# Patient Record
Sex: FEMALE | Race: OTHER | Employment: OTHER | URBAN - METROPOLITAN AREA
[De-identification: names, ages, dates, MRNs, and addresses within clinical notes are randomized per-mention and may not be internally consistent; named-entity substitution may affect disease eponyms.]

---

## 2017-01-18 ENCOUNTER — OFFICE VISIT (OUTPATIENT)
Dept: FAMILY MEDICINE CLINIC | Facility: CLINIC | Age: 37
End: 2017-01-18

## 2017-01-18 VITALS
HEART RATE: 96 BPM | SYSTOLIC BLOOD PRESSURE: 124 MMHG | TEMPERATURE: 99 F | DIASTOLIC BLOOD PRESSURE: 90 MMHG | BODY MASS INDEX: 35.18 KG/M2 | RESPIRATION RATE: 17 BRPM | WEIGHT: 211.19 LBS | HEIGHT: 64.96 IN

## 2017-01-18 DIAGNOSIS — E28.2 POLYCYSTIC OVARIES: ICD-10-CM

## 2017-01-18 DIAGNOSIS — N76.0 ACUTE VAGINITIS: ICD-10-CM

## 2017-01-18 DIAGNOSIS — F98.8 ADD (ATTENTION DEFICIT DISORDER): ICD-10-CM

## 2017-01-18 DIAGNOSIS — Z72.0 TOBACCO ABUSE: ICD-10-CM

## 2017-01-18 DIAGNOSIS — E66.09 NON MORBID OBESITY DUE TO EXCESS CALORIES: ICD-10-CM

## 2017-01-18 DIAGNOSIS — I10 ESSENTIAL HYPERTENSION WITH GOAL BLOOD PRESSURE LESS THAN 140/90: Primary | ICD-10-CM

## 2017-01-18 PROBLEM — F06.30 MENSTRUAL-RELATED MOOD DISORDER: Status: ACTIVE | Noted: 2017-01-18

## 2017-01-18 PROCEDURE — 99213 OFFICE O/P EST LOW 20 MIN: CPT | Performed by: FAMILY MEDICINE

## 2017-01-18 PROCEDURE — 99212 OFFICE O/P EST SF 10 MIN: CPT | Performed by: FAMILY MEDICINE

## 2017-01-18 RX ORDER — FLUCONAZOLE 150 MG/1
150 TABLET ORAL ONCE
Qty: 1 TABLET | Refills: 2 | Status: SHIPPED | OUTPATIENT
Start: 2017-01-18 | End: 2017-01-18

## 2017-01-18 RX ORDER — SPIRONOLACTONE 100 MG/1
100 TABLET, FILM COATED ORAL DAILY
Qty: 90 TABLET | Refills: 1 | Status: SHIPPED | OUTPATIENT
Start: 2017-01-18 | End: 2017-07-20

## 2017-01-18 RX ORDER — DEXTROAMPHETAMINE SACCHARATE, AMPHETAMINE ASPARTATE, DEXTROAMPHETAMINE SULFATE AND AMPHETAMINE SULFATE 3.75; 3.75; 3.75; 3.75 MG/1; MG/1; MG/1; MG/1
15 TABLET ORAL DAILY
Qty: 30 TABLET | Refills: 0 | Status: SHIPPED | OUTPATIENT
Start: 2017-01-18 | End: 2017-03-16

## 2017-01-18 RX ORDER — BUPROPION HYDROCHLORIDE 150 MG/1
150 TABLET ORAL DAILY
Qty: 30 TABLET | Refills: 2 | Status: SHIPPED | OUTPATIENT
Start: 2017-01-18 | End: 2017-04-13

## 2017-01-18 NOTE — PROGRESS NOTES
HPI:    Patient ID: Janice Gary is a 39year old female. HTN  This is a new problem. The current episode started more than 1 month ago. The problem has been gradually improving since onset. The problem is uncontrolled.  Associated symptoms include anx She is alert and oriented to person, place, and time. Skin: Skin is warm and dry. Psychiatric: She has a normal mood and affect.               ASSESSMENT/PLAN:   Essential hypertension with goal blood pressure less than 140/90  (primary encounter diagno total) by mouth daily. fluconazole (DIFLUCAN) 150 MG Oral Tab 1 tablet 2      Sig: Take 1 tablet (150 mg total) by mouth once.            Imaging & Referrals:  None       #[id number]

## 2017-03-16 ENCOUNTER — OFFICE VISIT (OUTPATIENT)
Dept: FAMILY MEDICINE CLINIC | Facility: CLINIC | Age: 37
End: 2017-03-16

## 2017-03-16 ENCOUNTER — TELEPHONE (OUTPATIENT)
Dept: FAMILY MEDICINE CLINIC | Facility: CLINIC | Age: 37
End: 2017-03-16

## 2017-03-16 VITALS
HEIGHT: 64.96 IN | HEART RATE: 108 BPM | TEMPERATURE: 98 F | RESPIRATION RATE: 17 BRPM | BODY MASS INDEX: 35.18 KG/M2 | WEIGHT: 211.19 LBS | SYSTOLIC BLOOD PRESSURE: 126 MMHG | DIASTOLIC BLOOD PRESSURE: 88 MMHG

## 2017-03-16 DIAGNOSIS — N30.00 ACUTE CYSTITIS WITHOUT HEMATURIA: ICD-10-CM

## 2017-03-16 DIAGNOSIS — N76.0 ACUTE VAGINITIS: Primary | ICD-10-CM

## 2017-03-16 DIAGNOSIS — R30.0 DYSURIA: Primary | ICD-10-CM

## 2017-03-16 DIAGNOSIS — F98.8 ADD (ATTENTION DEFICIT DISORDER): ICD-10-CM

## 2017-03-16 DIAGNOSIS — I10 ESSENTIAL HYPERTENSION WITH GOAL BLOOD PRESSURE LESS THAN 140/90: ICD-10-CM

## 2017-03-16 LAB
APPEARANCE: CLEAR
BILIRUBIN: NEGATIVE
GLUCOSE (URINE DIPSTICK): NEGATIVE MG/DL
LEUKOCYTES: NEGATIVE
MULTISTIX LOT#: NORMAL NUMERIC
NITRITE, URINE: NEGATIVE
OCCULT BLOOD: NEGATIVE
PH, URINE: 5.5 (ref 4.5–8)
PROTEIN (URINE DIPSTICK): NEGATIVE MG/DL
SPECIFIC GRAVITY: 1.03 (ref 1–1.03)
URINE-COLOR: YELLOW
UROBILINOGEN,SEMI-QN: 0.2 MG/DL (ref 0–1.9)

## 2017-03-16 PROCEDURE — 81003 URINALYSIS AUTO W/O SCOPE: CPT | Performed by: FAMILY MEDICINE

## 2017-03-16 PROCEDURE — 99213 OFFICE O/P EST LOW 20 MIN: CPT | Performed by: FAMILY MEDICINE

## 2017-03-16 RX ORDER — DEXTROAMPHETAMINE SACCHARATE, AMPHETAMINE ASPARTATE, DEXTROAMPHETAMINE SULFATE AND AMPHETAMINE SULFATE 3.75; 3.75; 3.75; 3.75 MG/1; MG/1; MG/1; MG/1
15 TABLET ORAL DAILY
Qty: 30 TABLET | Refills: 0 | Status: SHIPPED | OUTPATIENT
Start: 2017-03-16 | End: 2018-04-20

## 2017-03-16 RX ORDER — AMLODIPINE BESYLATE 5 MG/1
5 TABLET ORAL DAILY
Qty: 90 TABLET | Refills: 3 | Status: SHIPPED | OUTPATIENT
Start: 2017-03-16 | End: 2018-03-21

## 2017-03-16 NOTE — PROGRESS NOTES
HPI:    Patient ID: James Jones is a 39year old female. Painful Urination   This is a new problem. The current episode started in the past 7 days. The problem has been rapidly improving. The quality of the pain is described as burning.  There has bee hematuria  Add (attention deficit disorder)  Essential hypertension with goal blood pressure less than 140/90     UTI–patient had severe dysuria and frequency 2 days ago, seen in ER, treated with ? Levaquin.   Symptoms resolved, urinalysis normal but very c

## 2017-03-23 ENCOUNTER — LAB ENCOUNTER (OUTPATIENT)
Dept: LAB | Age: 37
End: 2017-03-23
Attending: FAMILY MEDICINE
Payer: COMMERCIAL

## 2017-03-23 ENCOUNTER — OFFICE VISIT (OUTPATIENT)
Dept: FAMILY MEDICINE CLINIC | Facility: CLINIC | Age: 37
End: 2017-03-23

## 2017-03-23 VITALS
RESPIRATION RATE: 16 BRPM | TEMPERATURE: 98 F | WEIGHT: 205 LBS | HEIGHT: 64 IN | SYSTOLIC BLOOD PRESSURE: 131 MMHG | DIASTOLIC BLOOD PRESSURE: 91 MMHG | BODY MASS INDEX: 35 KG/M2 | HEART RATE: 111 BPM

## 2017-03-23 DIAGNOSIS — I10 ESSENTIAL HYPERTENSION WITH GOAL BLOOD PRESSURE LESS THAN 140/90: ICD-10-CM

## 2017-03-23 DIAGNOSIS — Z01.419 ENCOUNTER FOR GYNECOLOGICAL EXAMINATION WITHOUT ABNORMAL FINDING: Primary | ICD-10-CM

## 2017-03-23 DIAGNOSIS — Z01.419 PAP SMEAR, LOW-RISK: Primary | ICD-10-CM

## 2017-03-23 DIAGNOSIS — Z72.0 TOBACCO ABUSE: ICD-10-CM

## 2017-03-23 DIAGNOSIS — N76.0 ACUTE VAGINITIS: ICD-10-CM

## 2017-03-23 DIAGNOSIS — F98.8 ADD (ATTENTION DEFICIT DISORDER): ICD-10-CM

## 2017-03-23 DIAGNOSIS — E66.9 NON MORBID OBESITY, UNSPECIFIED OBESITY TYPE: ICD-10-CM

## 2017-03-23 DIAGNOSIS — Z01.419 ENCOUNTER FOR GYNECOLOGICAL EXAMINATION WITHOUT ABNORMAL FINDING: ICD-10-CM

## 2017-03-23 LAB
ANION GAP SERPL CALC-SCNC: 7 MMOL/L (ref 0–18)
BUN SERPL-MCNC: 10 MG/DL (ref 8–20)
BUN/CREAT SERPL: 11.8 (ref 10–20)
CALCIUM SERPL-MCNC: 9.8 MG/DL (ref 8.5–10.5)
CHLORIDE SERPL-SCNC: 105 MMOL/L (ref 95–110)
CHOLEST SERPL-MCNC: 183 MG/DL (ref 110–200)
CO2 SERPL-SCNC: 26 MMOL/L (ref 22–32)
CREAT SERPL-MCNC: 0.85 MG/DL (ref 0.5–1.5)
ERYTHROCYTE [DISTWIDTH] IN BLOOD BY AUTOMATED COUNT: 13.5 % (ref 11–15)
GLUCOSE SERPL-MCNC: 95 MG/DL (ref 70–99)
HCT VFR BLD AUTO: 39.3 % (ref 35–48)
HDLC SERPL-MCNC: 68 MG/DL
HGB BLD-MCNC: 13.1 G/DL (ref 12–16)
LDLC SERPL CALC-MCNC: 103 MG/DL (ref 0–99)
MCH RBC QN AUTO: 29.4 PG (ref 27–32)
MCHC RBC AUTO-ENTMCNC: 33.4 G/DL (ref 32–37)
MCV RBC AUTO: 88.1 FL (ref 80–100)
NONHDLC SERPL-MCNC: 115 MG/DL
OSMOLALITY UR CALC.SUM OF ELEC: 285 MOSM/KG (ref 275–295)
PLATELET # BLD AUTO: 276 K/UL (ref 140–400)
PMV BLD AUTO: 10 FL (ref 7.4–10.3)
POTASSIUM SERPL-SCNC: 4.4 MMOL/L (ref 3.3–5.1)
RBC # BLD AUTO: 4.47 M/UL (ref 3.7–5.4)
SODIUM SERPL-SCNC: 138 MMOL/L (ref 136–144)
TRIGL SERPL-MCNC: 61 MG/DL (ref 1–149)
TSH SERPL-ACNC: 0.95 UIU/ML (ref 0.34–5.6)
WBC # BLD AUTO: 7.2 K/UL (ref 4–11)

## 2017-03-23 PROCEDURE — 86696 HERPES SIMPLEX TYPE 2 TEST: CPT | Performed by: FAMILY MEDICINE

## 2017-03-23 PROCEDURE — 86694 HERPES SIMPLEX NES ANTBDY: CPT | Performed by: FAMILY MEDICINE

## 2017-03-23 PROCEDURE — 84443 ASSAY THYROID STIM HORMONE: CPT

## 2017-03-23 PROCEDURE — 86695 HERPES SIMPLEX TYPE 1 TEST: CPT | Performed by: FAMILY MEDICINE

## 2017-03-23 PROCEDURE — 86780 TREPONEMA PALLIDUM: CPT

## 2017-03-23 PROCEDURE — 85027 COMPLETE CBC AUTOMATED: CPT

## 2017-03-23 PROCEDURE — 36415 COLL VENOUS BLD VENIPUNCTURE: CPT

## 2017-03-23 PROCEDURE — 87389 HIV-1 AG W/HIV-1&-2 AB AG IA: CPT

## 2017-03-23 PROCEDURE — 99395 PREV VISIT EST AGE 18-39: CPT | Performed by: FAMILY MEDICINE

## 2017-03-23 PROCEDURE — 80048 BASIC METABOLIC PNL TOTAL CA: CPT

## 2017-03-23 PROCEDURE — 80061 LIPID PANEL: CPT

## 2017-03-23 NOTE — PROGRESS NOTES
HPI:    Patient ID: Tyler Verduzco is a 39year old female. HPI    Review of Systems   Constitutional: Negative. Respiratory: Negative. Cardiovascular: Negative. Gastrointestinal: Negative.     Genitourinary: Positive for vaginal discharge and gen regular. Using condoms. Again discussed contraceptive options, encourage IUD. Nonfasting labs done today. Pap smear done.     Hypertension–blood pressure controlled with spironolactone and amlodipine, continue current medications    Nonmorbid obesity–di

## 2017-03-24 ENCOUNTER — TELEPHONE (OUTPATIENT)
Dept: FAMILY MEDICINE CLINIC | Facility: CLINIC | Age: 37
End: 2017-03-24

## 2017-03-24 LAB
HIV1+2 AB SERPL QL IA: NONREACTIVE
HSV1 DNA SPEC QL NAA+PROBE: NEGATIVE
HSV2 DNA SPEC QL NAA+PROBE: POSITIVE
T PALLIDUM AB SER QL: NEGATIVE

## 2017-03-24 RX ORDER — VALACYCLOVIR HYDROCHLORIDE 1 G/1
1 TABLET, FILM COATED ORAL EVERY 12 HOURS SCHEDULED
Qty: 20 TABLET | Refills: 0 | Status: SHIPPED | OUTPATIENT
Start: 2017-03-24 | End: 2017-04-03

## 2017-03-25 LAB
C TRACH DNA SPEC QL NAA+PROBE: NEGATIVE
HSV 1 GLYCOPROTEIN G AB, IGG: 4.79 IV
HSV 2 GLYCOPROTEIN G AB, IGG: >23.6 IV
N GONORRHOEA DNA SPEC QL NAA+PROBE: NEGATIVE

## 2017-03-26 LAB
HSV TYPE 1/2 COMBINED AB, IGG: >22.4 IV
HSV TYPE 1/2 COMBINED ABS, IGM: 1.27 IV

## 2017-03-29 LAB — HPV I/H RISK 1 DNA SPEC QL NAA+PROBE: NEGATIVE

## 2017-04-10 ENCOUNTER — TELEPHONE (OUTPATIENT)
Dept: FAMILY MEDICINE CLINIC | Facility: CLINIC | Age: 37
End: 2017-04-10

## 2017-04-10 RX ORDER — DEXTROAMPHETAMINE SACCHARATE, AMPHETAMINE ASPARTATE, DEXTROAMPHETAMINE SULFATE AND AMPHETAMINE SULFATE 3.75; 3.75; 3.75; 3.75 MG/1; MG/1; MG/1; MG/1
15 TABLET ORAL DAILY
Qty: 30 TABLET | Refills: 0 | Status: CANCELLED | OUTPATIENT
Start: 2017-04-10

## 2017-04-10 NOTE — TELEPHONE ENCOUNTER
Pt calling regarding  refill request for amphetamine-dextroamphetamine (ADDERALL) 15 MG Oral Tab      Current Outpatient Prescriptions:  amphetamine-dextroamphetamine (ADDERALL) 15 MG Oral Tab Take 1 tablet (15 mg total) by mouth daily.  Disp: 30 tablet Rfl

## 2017-04-11 RX ORDER — DEXTROAMPHETAMINE SACCHARATE, AMPHETAMINE ASPARTATE, DEXTROAMPHETAMINE SULFATE AND AMPHETAMINE SULFATE 3.75; 3.75; 3.75; 3.75 MG/1; MG/1; MG/1; MG/1
15 TABLET ORAL DAILY
Qty: 30 TABLET | Refills: 0 | Status: SHIPPED | OUTPATIENT
Start: 2017-04-11 | End: 2017-05-11

## 2017-04-11 RX ORDER — DEXTROAMPHETAMINE SACCHARATE, AMPHETAMINE ASPARTATE, DEXTROAMPHETAMINE SULFATE AND AMPHETAMINE SULFATE 3.75; 3.75; 3.75; 3.75 MG/1; MG/1; MG/1; MG/1
15 TABLET ORAL DAILY
Qty: 30 TABLET | Refills: 0 | Status: SHIPPED | OUTPATIENT
Start: 2017-06-10 | End: 2017-07-10

## 2017-04-11 RX ORDER — DEXTROAMPHETAMINE SACCHARATE, AMPHETAMINE ASPARTATE, DEXTROAMPHETAMINE SULFATE AND AMPHETAMINE SULFATE 3.75; 3.75; 3.75; 3.75 MG/1; MG/1; MG/1; MG/1
15 TABLET ORAL DAILY
Qty: 30 TABLET | Refills: 0 | Status: SHIPPED | OUTPATIENT
Start: 2017-05-11 | End: 2017-06-10

## 2017-04-11 NOTE — TELEPHONE ENCOUNTER
Pt called and was informed that her med was approved.  She will p/u scripts tomorrow 04/12 while at her son's appt

## 2017-04-11 NOTE — TELEPHONE ENCOUNTER
Requesting Amphetamine-dextroamphetamine refill    Last filled 3/16/17  Last OV 3/23/17    Med pended for review

## 2017-04-12 RX ORDER — DEXTROAMPHETAMINE SACCHARATE, AMPHETAMINE ASPARTATE, DEXTROAMPHETAMINE SULFATE AND AMPHETAMINE SULFATE 3.75; 3.75; 3.75; 3.75 MG/1; MG/1; MG/1; MG/1
15 TABLET ORAL DAILY
Qty: 30 TABLET | Refills: 0 | Status: CANCELLED | OUTPATIENT
Start: 2017-04-12

## 2017-04-12 RX ORDER — VALACYCLOVIR HYDROCHLORIDE 500 MG/1
500 TABLET, FILM COATED ORAL DAILY
Qty: 90 TABLET | Refills: 3 | Status: SHIPPED | OUTPATIENT
Start: 2017-04-12 | End: 2018-12-14

## 2017-04-12 NOTE — TELEPHONE ENCOUNTER
From: Sg Davidson  To: Ana Farah MD  Sent: 4/10/2017 2:30 PM CDT  Subject: Medication Renewal Request    Original authorizing provider: MD Gita Garrido would like a refill of the following medications:  amphetamine-dextroamphetami

## 2017-04-13 RX ORDER — BUPROPION HYDROCHLORIDE 150 MG/1
TABLET ORAL
Qty: 30 TABLET | Refills: 11 | Status: SHIPPED | OUTPATIENT
Start: 2017-04-13 | End: 2018-05-22

## 2017-07-23 RX ORDER — SPIRONOLACTONE 100 MG/1
TABLET, FILM COATED ORAL
Qty: 90 TABLET | Refills: 1 | Status: SHIPPED | OUTPATIENT
Start: 2017-07-23 | End: 2017-12-15

## 2017-09-12 NOTE — PROGRESS NOTES
HPI:    Patient ID: Wesley Lerma is a 39year old female. ADD   This is a chronic problem. The current episode started more than 1 year ago. The problem occurs daily. The problem has been unchanged.  Pertinent negatives include no fatigue, headaches or n mood and affect. ASSESSMENT/PLAN:   Attention deficit disorder (add) without hyperactivity  (primary encounter diagnosis)  Tobacco abuse     Attention deficit disorder–patient continues to do well with Adderall 15 mg every morning.   She contin

## 2017-12-15 RX ORDER — SPIRONOLACTONE 100 MG/1
TABLET, FILM COATED ORAL
Qty: 90 TABLET | Refills: 0 | Status: SHIPPED | OUTPATIENT
Start: 2017-12-15 | End: 2018-04-23

## 2017-12-27 NOTE — TELEPHONE ENCOUNTER
Pt called in requesting a 90 day supply of the following medication;      Current Outpatient Prescriptions:       •  amphetamine-dextroamphetamine (ADDERALL) 15 MG Oral Tab, Take 1 tablet (15 mg total) by mouth daily. , Disp: 30 tablet, Rfl: 0

## 2017-12-28 RX ORDER — DEXTROAMPHETAMINE SACCHARATE, AMPHETAMINE ASPARTATE, DEXTROAMPHETAMINE SULFATE AND AMPHETAMINE SULFATE 3.75; 3.75; 3.75; 3.75 MG/1; MG/1; MG/1; MG/1
15 TABLET ORAL DAILY
Qty: 30 TABLET | Refills: 0 | Status: SHIPPED | OUTPATIENT
Start: 2018-02-26 | End: 2018-03-28

## 2017-12-28 RX ORDER — DEXTROAMPHETAMINE SACCHARATE, AMPHETAMINE ASPARTATE, DEXTROAMPHETAMINE SULFATE AND AMPHETAMINE SULFATE 3.75; 3.75; 3.75; 3.75 MG/1; MG/1; MG/1; MG/1
15 TABLET ORAL DAILY
Qty: 30 TABLET | Refills: 0 | Status: SHIPPED | OUTPATIENT
Start: 2018-01-27 | End: 2018-02-26

## 2017-12-28 RX ORDER — DEXTROAMPHETAMINE SACCHARATE, AMPHETAMINE ASPARTATE, DEXTROAMPHETAMINE SULFATE AND AMPHETAMINE SULFATE 3.75; 3.75; 3.75; 3.75 MG/1; MG/1; MG/1; MG/1
15 TABLET ORAL DAILY
Qty: 30 TABLET | Refills: 0 | Status: SHIPPED | OUTPATIENT
Start: 2017-12-28 | End: 2018-01-27

## 2018-02-16 RX ORDER — SPIRONOLACTONE 100 MG/1
TABLET, FILM COATED ORAL
Qty: 90 TABLET | Refills: 0 | Status: SHIPPED | OUTPATIENT
Start: 2018-02-16 | End: 2018-04-23

## 2018-03-22 RX ORDER — AMLODIPINE BESYLATE 5 MG/1
TABLET ORAL
Qty: 90 TABLET | Refills: 0 | Status: SHIPPED | OUTPATIENT
Start: 2018-03-22 | End: 2018-04-23

## 2018-03-22 NOTE — TELEPHONE ENCOUNTER
[facility] FYI. Spoke with pt to schedule appt. Pt stated that she does not have insurance right now and will call back to schedule once she get insurance.

## 2018-04-19 NOTE — TELEPHONE ENCOUNTER
Pt is out of medication. Pt has appt on 5/22      amphetamine-dextroamphetamine (ADDERALL) 15 MG Oral Tab Take 1 tablet (15 mg total) by mouth daily.  Disp: 30 tablet Rfl: 0

## 2018-04-20 RX ORDER — DEXTROAMPHETAMINE SACCHARATE, AMPHETAMINE ASPARTATE, DEXTROAMPHETAMINE SULFATE AND AMPHETAMINE SULFATE 3.75; 3.75; 3.75; 3.75 MG/1; MG/1; MG/1; MG/1
15 TABLET ORAL DAILY
Qty: 30 TABLET | Refills: 0 | Status: SHIPPED | OUTPATIENT
Start: 2018-04-20 | End: 2018-05-22 | Stop reason: ALTCHOICE

## 2018-04-20 NOTE — TELEPHONE ENCOUNTER
Please advise on refill request.     Refill Protocol Appointment Criteria  · Appointment scheduled in the past 6 months or in the next 3 months  Recent Outpatient Visits            7 months ago Attention deficit disorder (ADD) without hyperactivity    [facility]

## 2018-04-23 ENCOUNTER — OFFICE VISIT (OUTPATIENT)
Dept: FAMILY MEDICINE CLINIC | Facility: CLINIC | Age: 38
End: 2018-04-23

## 2018-04-23 VITALS
DIASTOLIC BLOOD PRESSURE: 88 MMHG | HEART RATE: 111 BPM | SYSTOLIC BLOOD PRESSURE: 143 MMHG | RESPIRATION RATE: 14 BRPM | WEIGHT: 225.63 LBS | HEIGHT: 65 IN | BODY MASS INDEX: 37.59 KG/M2 | TEMPERATURE: 98 F

## 2018-04-23 DIAGNOSIS — I10 ESSENTIAL HYPERTENSION: Primary | ICD-10-CM

## 2018-04-23 DIAGNOSIS — E11.9 TYPE 2 DIABETES MELLITUS WITHOUT COMPLICATION, WITHOUT LONG-TERM CURRENT USE OF INSULIN (HCC): ICD-10-CM

## 2018-04-23 PROCEDURE — 99212 OFFICE O/P EST SF 10 MIN: CPT | Performed by: FAMILY MEDICINE

## 2018-04-23 PROCEDURE — 99214 OFFICE O/P EST MOD 30 MIN: CPT | Performed by: FAMILY MEDICINE

## 2018-04-23 RX ORDER — SPIRONOLACTONE 100 MG/1
TABLET, FILM COATED ORAL
Qty: 90 TABLET | Refills: 1 | Status: SHIPPED | OUTPATIENT
Start: 2018-04-23 | End: 2018-12-13

## 2018-04-23 RX ORDER — AMLODIPINE BESYLATE 5 MG/1
TABLET ORAL
Qty: 90 TABLET | Refills: 0 | Status: SHIPPED | OUTPATIENT
Start: 2018-04-23 | End: 2018-05-22 | Stop reason: ALTCHOICE

## 2018-04-23 NOTE — PROGRESS NOTES
HPI:    Armando Escalante is a 44year old female presents to clinic for follow up. Has HTN. Needs refills on meds. Notes compliance with meds. Still eats fast foods. Notes that she quit smoking 2 months back, has gained weight because she snacks more.  Vida Patient Position: Sitting   Cuff Size: adult   Pulse: 111   Resp: 14   Temp: 98.2 °F (36.8 °C)   TempSrc: Tympanic   Weight: 225 lb 9.6 oz (102.3 kg)   Height: 5' 5\" (1.651 m)   Physical Exam   Constitutional: She is well-developed, well-nourished, and AmLODIPine Besylate 5 MG Oral Tab 90 tablet 0      Sig: TAKE 1 TABLET(5 MG) BY MOUTH DAILY           Imaging & Referrals:  None       4/23/2018  Lauren Orr MD

## 2018-05-22 ENCOUNTER — OFFICE VISIT (OUTPATIENT)
Dept: FAMILY MEDICINE CLINIC | Facility: CLINIC | Age: 38
End: 2018-05-22

## 2018-05-22 VITALS
TEMPERATURE: 98 F | BODY MASS INDEX: 36.55 KG/M2 | RESPIRATION RATE: 17 BRPM | HEART RATE: 93 BPM | WEIGHT: 219.38 LBS | DIASTOLIC BLOOD PRESSURE: 79 MMHG | SYSTOLIC BLOOD PRESSURE: 116 MMHG | HEIGHT: 65 IN

## 2018-05-22 DIAGNOSIS — D62 ANEMIA DUE TO ACUTE BLOOD LOSS: ICD-10-CM

## 2018-05-22 DIAGNOSIS — I10 ESSENTIAL HYPERTENSION: ICD-10-CM

## 2018-05-22 DIAGNOSIS — K52.9 GASTROENTERITIS: ICD-10-CM

## 2018-05-22 DIAGNOSIS — F98.8 ATTENTION DEFICIT DISORDER (ADD) WITHOUT HYPERACTIVITY: ICD-10-CM

## 2018-05-22 DIAGNOSIS — R73.9 ELEVATED BLOOD SUGAR: ICD-10-CM

## 2018-05-22 DIAGNOSIS — Z87.891 FORMER SMOKER: ICD-10-CM

## 2018-05-22 DIAGNOSIS — IMO0001 POSTOPERATIVE WOUND INFECTION, SUBSEQUENT ENCOUNTER: Primary | ICD-10-CM

## 2018-05-22 PROBLEM — T81.49XA POSTOPERATIVE WOUND INFECTION: Status: ACTIVE | Noted: 2018-05-22

## 2018-05-22 PROCEDURE — 99213 OFFICE O/P EST LOW 20 MIN: CPT | Performed by: FAMILY MEDICINE

## 2018-05-22 PROCEDURE — 99212 OFFICE O/P EST SF 10 MIN: CPT | Performed by: FAMILY MEDICINE

## 2018-05-22 RX ORDER — HYDROCODONE BITARTRATE AND ACETAMINOPHEN 5; 325 MG/1; MG/1
1 TABLET ORAL
COMMUNITY
Start: 2018-05-18 | End: 2018-05-29

## 2018-05-22 RX ORDER — BUPROPION HYDROCHLORIDE 150 MG/1
TABLET ORAL
Qty: 30 TABLET | Refills: 11 | Status: SHIPPED | OUTPATIENT
Start: 2018-05-22 | End: 2019-01-07 | Stop reason: ALTCHOICE

## 2018-05-22 RX ORDER — METRONIDAZOLE 500 MG/1
500 TABLET ORAL
COMMUNITY
Start: 2018-05-18 | End: 2018-05-25

## 2018-05-22 RX ORDER — AMOXICILLIN AND CLAVULANATE POTASSIUM 875; 125 MG/1; MG/1
875 TABLET, FILM COATED ORAL
COMMUNITY
Start: 2018-05-18 | End: 2018-05-22

## 2018-05-22 RX ORDER — FERROUS SULFATE 325(65) MG
325 TABLET ORAL
COMMUNITY
Start: 2018-05-18 | End: 2018-10-12 | Stop reason: ALTCHOICE

## 2018-05-22 NOTE — PROGRESS NOTES
HPI:    Patient ID: Luma Buckner is a 44year old female. Wound Recheck   The current episode started 1 to 4 weeks ago. The problem has been gradually improving. Associated symptoms include fatigue.  Pertinent negatives include no chest pain, fever or Postoperative wound infection, subsequent encounter  (primary encounter diagnosis)  Gastroenteritis  Essential hypertension  Elevated blood sugar  Attention deficit disorder (add) without hyperactivity  Former smoker     Postoperative wound infection–pat continue iron and recheck CBC in 3 months. No orders of the defined types were placed in this encounter.       Meds This Visit:  Signed Prescriptions Disp Refills    BuPROPion HCl ER, XL, 150 MG Oral Tablet 24 Hr 30 tablet 11      Sig: TAKE 1 TABLET(15

## 2018-05-29 ENCOUNTER — TELEPHONE (OUTPATIENT)
Dept: OTHER | Age: 38
End: 2018-05-29

## 2018-05-29 ENCOUNTER — OFFICE VISIT (OUTPATIENT)
Dept: FAMILY MEDICINE CLINIC | Facility: CLINIC | Age: 38
End: 2018-05-29

## 2018-05-29 VITALS
WEIGHT: 221 LBS | SYSTOLIC BLOOD PRESSURE: 134 MMHG | BODY MASS INDEX: 37 KG/M2 | DIASTOLIC BLOOD PRESSURE: 89 MMHG | RESPIRATION RATE: 16 BRPM | TEMPERATURE: 98 F | HEART RATE: 104 BPM

## 2018-05-29 DIAGNOSIS — Z51.89 VISIT FOR WOUND CHECK: Primary | ICD-10-CM

## 2018-05-29 PROCEDURE — 99212 OFFICE O/P EST SF 10 MIN: CPT | Performed by: FAMILY MEDICINE

## 2018-05-29 PROCEDURE — 99211 OFF/OP EST MAY X REQ PHY/QHP: CPT | Performed by: FAMILY MEDICINE

## 2018-05-29 RX ORDER — AMLODIPINE BESYLATE 5 MG/1
5 TABLET ORAL DAILY
COMMUNITY
End: 2018-10-12 | Stop reason: ALTCHOICE

## 2018-05-29 NOTE — TELEPHONE ENCOUNTER
Pt called to F/U as mother is changing wound dressing BID and yesterday noticed malodorous green tinged drainage from buttocks area. Pt is requesting abx be prescribed. Afebrile denies tenderness to area. Advised OV for eval and assessment.   Pt schedule

## 2018-05-29 NOTE — TELEPHONE ENCOUNTER
Pt called to f/u and was informed of [facility]'s response below. Pt agrees and will come at 2:30pm today as scheduled.

## 2018-06-08 ENCOUNTER — NURSE TRIAGE (OUTPATIENT)
Dept: OTHER | Age: 38
End: 2018-06-08

## 2018-06-08 NOTE — TELEPHONE ENCOUNTER
Unfortunately, I cannot see her today. Can we give acute visit for Monday. Continue current Zyrtec and Benadryl and I will send Rx for cortisone cream to pharmacy. ER if worsening symptoms over the weekend.

## 2018-06-08 NOTE — TELEPHONE ENCOUNTER
Spoke with patient and relayed [facility] message below--patient verbalizes understanding and agreement. Appt made for Monday, 6/11/18 at 11:45 a.m.--per [facility]. No further questions/concerns at this time.

## 2018-06-08 NOTE — TELEPHONE ENCOUNTER
Action Requested: Summary for Provider     []  Critical Lab, Recommendations Needed  [x] Need Additional Advice  []   FYI    []   Need Orders  [x] Need Medications Sent to Pharmacy  []  Other     SUMMARY: Patient requesting appt with [facility] today and/or Rx to

## 2018-06-19 ENCOUNTER — OFFICE VISIT (OUTPATIENT)
Dept: FAMILY MEDICINE CLINIC | Facility: CLINIC | Age: 38
End: 2018-06-19

## 2018-06-19 ENCOUNTER — LAB ENCOUNTER (OUTPATIENT)
Dept: LAB | Age: 38
End: 2018-06-19
Attending: FAMILY MEDICINE
Payer: COMMERCIAL

## 2018-06-19 VITALS
HEIGHT: 65 IN | HEART RATE: 107 BPM | SYSTOLIC BLOOD PRESSURE: 138 MMHG | DIASTOLIC BLOOD PRESSURE: 91 MMHG | TEMPERATURE: 99 F | RESPIRATION RATE: 17 BRPM

## 2018-06-19 DIAGNOSIS — N91.3 PRIMARY OLIGOMENORRHEA: ICD-10-CM

## 2018-06-19 DIAGNOSIS — IMO0001 POSTOPERATIVE WOUND INFECTION, SUBSEQUENT ENCOUNTER: ICD-10-CM

## 2018-06-19 DIAGNOSIS — L50.9 URTICARIA: Primary | ICD-10-CM

## 2018-06-19 DIAGNOSIS — R60.9 SWELLING: ICD-10-CM

## 2018-06-19 DIAGNOSIS — L50.9 URTICARIA: ICD-10-CM

## 2018-06-19 PROCEDURE — 99213 OFFICE O/P EST LOW 20 MIN: CPT | Performed by: FAMILY MEDICINE

## 2018-06-19 PROCEDURE — 83001 ASSAY OF GONADOTROPIN (FSH): CPT

## 2018-06-19 PROCEDURE — 36415 COLL VENOUS BLD VENIPUNCTURE: CPT

## 2018-06-19 PROCEDURE — 84703 CHORIONIC GONADOTROPIN ASSAY: CPT

## 2018-06-19 PROCEDURE — 84443 ASSAY THYROID STIM HORMONE: CPT

## 2018-06-19 PROCEDURE — 99212 OFFICE O/P EST SF 10 MIN: CPT | Performed by: FAMILY MEDICINE

## 2018-06-19 PROCEDURE — 80048 BASIC METABOLIC PNL TOTAL CA: CPT

## 2018-06-19 PROCEDURE — 85025 COMPLETE CBC W/AUTO DIFF WBC: CPT

## 2018-06-19 RX ORDER — METHYLPREDNISOLONE 4 MG/1
TABLET ORAL
Qty: 1 KIT | Refills: 0 | Status: SHIPPED | OUTPATIENT
Start: 2018-06-19 | End: 2018-10-12 | Stop reason: ALTCHOICE

## 2018-06-19 NOTE — PROGRESS NOTES
HPI:    Patient ID: Venus Borden is a 44year old female. See below        Review of Systems   Constitutional: Negative for fever. Respiratory: Negative for shortness of breath and wheezing. Cardiovascular: Negative for chest pain.    Yasmin Gold urticaria 5 days ago, she has been using Zyrtec and Benadryl with moderate improvement but no resolution. Today on exam macular erythematous lesions on elbows, chest, back. Dermatographia present. Recommend continued Zyrtec daily for 1 month.   Medrol Do

## 2018-06-22 ENCOUNTER — PATIENT MESSAGE (OUTPATIENT)
Dept: FAMILY MEDICINE CLINIC | Facility: CLINIC | Age: 38
End: 2018-06-22

## 2018-06-22 NOTE — TELEPHONE ENCOUNTER
From: Lucy Davidson  To: Jacky Ordoñez MD  Sent: 6/22/2018 4:00 PM CDT  Subject: Visit Zaire Valdivia,    I am almost finished with my steroids for the hives but it didn't seem to work. I have three pills left. Suggestions?  Or shoul

## 2018-06-23 NOTE — TELEPHONE ENCOUNTER
Dr. Jacquie Porter, see pt response: To: EM CONOR Germain      From: Lexi Davidson      Created: 6/23/2018 11:54 AM        *-*-*This message has not been handled. *-*-*    Never got better.  No change.

## 2018-07-09 ENCOUNTER — PATIENT MESSAGE (OUTPATIENT)
Dept: FAMILY MEDICINE CLINIC | Facility: CLINIC | Age: 38
End: 2018-07-09

## 2018-07-10 ENCOUNTER — TELEPHONE (OUTPATIENT)
Dept: OTHER | Age: 38
End: 2018-07-10

## 2018-07-10 DIAGNOSIS — L50.9 URTICARIA: Primary | ICD-10-CM

## 2018-07-10 DIAGNOSIS — L50.3 DERMATOGRAPHIA: ICD-10-CM

## 2018-07-10 RX ORDER — DEXTROAMPHETAMINE SACCHARATE, AMPHETAMINE ASPARTATE, DEXTROAMPHETAMINE SULFATE AND AMPHETAMINE SULFATE 5; 5; 5; 5 MG/1; MG/1; MG/1; MG/1
20 TABLET ORAL DAILY
Qty: 30 TABLET | Refills: 0 | Status: SHIPPED | OUTPATIENT
Start: 2018-08-09 | End: 2018-09-08

## 2018-07-10 RX ORDER — DEXTROAMPHETAMINE SACCHARATE, AMPHETAMINE ASPARTATE, DEXTROAMPHETAMINE SULFATE AND AMPHETAMINE SULFATE 5; 5; 5; 5 MG/1; MG/1; MG/1; MG/1
20 TABLET ORAL DAILY
Qty: 30 TABLET | Refills: 0 | Status: SHIPPED | OUTPATIENT
Start: 2018-07-10 | End: 2018-08-09

## 2018-07-10 RX ORDER — DEXTROAMPHETAMINE SACCHARATE, AMPHETAMINE ASPARTATE, DEXTROAMPHETAMINE SULFATE AND AMPHETAMINE SULFATE 5; 5; 5; 5 MG/1; MG/1; MG/1; MG/1
20 TABLET ORAL DAILY
Qty: 30 TABLET | Refills: 0 | Status: SHIPPED | OUTPATIENT
Start: 2018-09-08 | End: 2018-10-08

## 2018-07-10 NOTE — TELEPHONE ENCOUNTER
Contacted patient and she states the hives in an ongoing issue. Patient states she sent My Chart message to update Dr. Eric Walsh on her condition. Dr. Eric Walsh, please see patient message below.    Patient also requesting refill for Adderall, requesting in

## 2018-07-10 NOTE — TELEPHONE ENCOUNTER
Please let patient know I recommend evaluation with allergist Dr. Jasen Be for hives. In the meantime, please continue with Zyrtec and Pepcid also recommend stopping these medications 5 days prior to Dr. Jasen Be appointment.   Okay to increase dose of Adderall,

## 2018-07-10 NOTE — TELEPHONE ENCOUNTER
Note      From: Bufford Cushing Hennis  To: Leo Robledo MD  Sent: 7/9/2018  8:14 AM CDT  Subject: Prescription Question    Hi Dr. Adalid Dotson.      I still have the hives and itching.  The zyrtec and pepcid help some but I still look like an etch a sketch.  Raised h

## 2018-07-10 NOTE — TELEPHONE ENCOUNTER
From: Sukumar Davidson  To: Delroy Brower MD  Sent: 7/9/2018 8:14 AM CDT  Subject: Prescription Question    Hi Dr. Dennis Haque. I still have the hives and itching. The zyrtec and pepcid help some but I still look like an etch a sketch. Raised hives.      Inder

## 2018-07-15 RX ORDER — AMLODIPINE BESYLATE 5 MG/1
TABLET ORAL
Qty: 90 TABLET | Refills: 0 | Status: SHIPPED | OUTPATIENT
Start: 2018-07-15 | End: 2018-10-12 | Stop reason: ALTCHOICE

## 2018-10-05 RX ORDER — DEXTROAMPHETAMINE SACCHARATE, AMPHETAMINE ASPARTATE, DEXTROAMPHETAMINE SULFATE AND AMPHETAMINE SULFATE 5; 5; 5; 5 MG/1; MG/1; MG/1; MG/1
20 TABLET ORAL DAILY
Qty: 30 TABLET | Refills: 0 | Status: CANCELLED | OUTPATIENT
Start: 2018-10-05 | End: 2018-11-04

## 2018-10-05 NOTE — TELEPHONE ENCOUNTER
Pt called in stating that she is completely out of medication. Pt is also wondering if she could get a 3 month supply for medication to cut down on going to the office every month for the script. Please advise.

## 2018-10-08 RX ORDER — DEXTROAMPHETAMINE SACCHARATE, AMPHETAMINE ASPARTATE, DEXTROAMPHETAMINE SULFATE AND AMPHETAMINE SULFATE 5; 5; 5; 5 MG/1; MG/1; MG/1; MG/1
20 TABLET ORAL DAILY
Qty: 30 TABLET | Refills: 0 | Status: SHIPPED | OUTPATIENT
Start: 2018-11-05 | End: 2018-10-12

## 2018-10-08 RX ORDER — DEXTROAMPHETAMINE SACCHARATE, AMPHETAMINE ASPARTATE, DEXTROAMPHETAMINE SULFATE AND AMPHETAMINE SULFATE 5; 5; 5; 5 MG/1; MG/1; MG/1; MG/1
20 TABLET ORAL DAILY
Qty: 30 TABLET | Refills: 0 | Status: SHIPPED | OUTPATIENT
Start: 2018-10-08 | End: 2018-11-07

## 2018-10-08 RX ORDER — DEXTROAMPHETAMINE SACCHARATE, AMPHETAMINE ASPARTATE, DEXTROAMPHETAMINE SULFATE AND AMPHETAMINE SULFATE 5; 5; 5; 5 MG/1; MG/1; MG/1; MG/1
20 TABLET ORAL DAILY
Qty: 30 TABLET | Refills: 0 | Status: SHIPPED | OUTPATIENT
Start: 2018-12-04 | End: 2018-10-12

## 2018-10-12 ENCOUNTER — OFFICE VISIT (OUTPATIENT)
Dept: FAMILY MEDICINE CLINIC | Facility: CLINIC | Age: 38
End: 2018-10-12
Payer: COMMERCIAL

## 2018-10-12 VITALS
WEIGHT: 219.81 LBS | DIASTOLIC BLOOD PRESSURE: 68 MMHG | TEMPERATURE: 99 F | SYSTOLIC BLOOD PRESSURE: 106 MMHG | HEART RATE: 68 BPM | BODY MASS INDEX: 37.52 KG/M2 | HEIGHT: 64.25 IN | RESPIRATION RATE: 16 BRPM

## 2018-10-12 DIAGNOSIS — E66.9 NON MORBID OBESITY, UNSPECIFIED OBESITY TYPE: Primary | ICD-10-CM

## 2018-10-12 DIAGNOSIS — I10 HTN (HYPERTENSION), BENIGN: ICD-10-CM

## 2018-10-12 DIAGNOSIS — R06.83 SNORING: ICD-10-CM

## 2018-10-12 PROBLEM — Z72.0 TOBACCO ABUSE: Status: RESOLVED | Noted: 2017-03-23 | Resolved: 2018-10-12

## 2018-10-12 PROCEDURE — 99214 OFFICE O/P EST MOD 30 MIN: CPT | Performed by: FAMILY MEDICINE

## 2018-10-12 PROCEDURE — 99212 OFFICE O/P EST SF 10 MIN: CPT | Performed by: FAMILY MEDICINE

## 2018-10-12 PROCEDURE — 90471 IMMUNIZATION ADMIN: CPT | Performed by: FAMILY MEDICINE

## 2018-10-12 PROCEDURE — 90686 IIV4 VACC NO PRSV 0.5 ML IM: CPT | Performed by: FAMILY MEDICINE

## 2018-10-12 NOTE — PROGRESS NOTES
HPI:    Patient ID: Sophia Kirkland is a 43year old female. Obesity   This is a chronic problem. The current episode started more than 1 year ago. The problem occurs daily. The problem has been unchanged.  Pertinent negatives include no abdominal pain, n Agree with pursuing evaluation and monthly weight checks. Will obtain sleep study and chest x-ray. Will follow-up here preop  for EKG, required labs in 4-6 months.     Orders Placed This Encounter      Flulaval 0.5 ml 6 mon and older Quad single dose PF (

## 2018-12-14 RX ORDER — VALACYCLOVIR HYDROCHLORIDE 500 MG/1
TABLET, FILM COATED ORAL
Qty: 90 TABLET | Refills: 0 | OUTPATIENT
Start: 2018-12-14

## 2018-12-14 RX ORDER — SPIRONOLACTONE 100 MG/1
TABLET, FILM COATED ORAL
Qty: 90 TABLET | Refills: 1 | OUTPATIENT
Start: 2018-12-14

## 2018-12-14 RX ORDER — SPIRONOLACTONE 100 MG/1
TABLET, FILM COATED ORAL
Qty: 90 TABLET | Refills: 0 | Status: SHIPPED | OUTPATIENT
Start: 2018-12-14 | End: 2019-03-21

## 2018-12-14 RX ORDER — VALACYCLOVIR HYDROCHLORIDE 500 MG/1
500 TABLET, FILM COATED ORAL DAILY
Qty: 90 TABLET | Refills: 3 | OUTPATIENT
Start: 2018-12-14

## 2018-12-15 NOTE — TELEPHONE ENCOUNTER
No Protocol on this med. Requested Prescriptions     Pending Prescriptions Disp Refills   • ValACYclovir HCl 500 MG Oral Tab 90 tablet 3     Sig: Take 1 tablet (500 mg total) by mouth daily.        Last Office Visit with PCP: 10/12/2018  Last Blood Pres

## 2018-12-15 NOTE — TELEPHONE ENCOUNTER
Requested Prescriptions     Pending Prescriptions Disp Refills   • VALACYCLOVIR  MG Oral Tab [Pharmacy Med Name: VALACYCLOVIR 500MG TABLETS] 90 tablet 0     Sig: TAKE 1 TABLET(500 MG) BY MOUTH DAILY     Duplicate request.

## 2018-12-16 RX ORDER — VALACYCLOVIR HYDROCHLORIDE 500 MG/1
500 TABLET, FILM COATED ORAL DAILY
Qty: 90 TABLET | Refills: 3 | Status: SHIPPED | OUTPATIENT
Start: 2018-12-16 | End: 2020-01-07

## 2019-01-02 ENCOUNTER — TELEPHONE (OUTPATIENT)
Dept: FAMILY MEDICINE CLINIC | Facility: CLINIC | Age: 39
End: 2019-01-02

## 2019-01-02 NOTE — TELEPHONE ENCOUNTER
Pt called back and stated she does not have an actual surgery date yet    Pt stated it will be after 1/21/19    Per the note below pt has scheduled her Px 1/7/19 at 930am, and a regular OV 1/16/19 at 945am

## 2019-01-02 NOTE — TELEPHONE ENCOUNTER
Pt called in requested orders for      Blood Work  EKG  Chest X Ray    Pt said she is having bariatric surgery surgery  Surgery date probably in Feb

## 2019-01-02 NOTE — TELEPHONE ENCOUNTER
pt has an appt on 01/07/2019, would you pls ask pt when will be her Surgery, because pt needs to come in 30 days with-in her surgery date excluding her yearly px!!! LMTCB!!!!

## 2019-01-03 RX ORDER — DEXTROAMPHETAMINE SACCHARATE, AMPHETAMINE ASPARTATE, DEXTROAMPHETAMINE SULFATE AND AMPHETAMINE SULFATE 5; 5; 5; 5 MG/1; MG/1; MG/1; MG/1
20 TABLET ORAL DAILY
Qty: 30 TABLET | Refills: 0 | Status: CANCELLED | OUTPATIENT
Start: 2019-01-03 | End: 2019-02-02

## 2019-01-03 NOTE — TELEPHONE ENCOUNTER
Please let her know I am out of the office. If needed sooner than Monday let one of my colleagues know.

## 2019-01-03 NOTE — TELEPHONE ENCOUNTER
Spoke with Josue Marsh, She will bring the list from her surgeon of the blood work she needs prior to surgery and she will get the blood work at the [address] along with the EKG and a order for chest xray

## 2019-01-05 RX ORDER — DEXTROAMPHETAMINE SACCHARATE, AMPHETAMINE ASPARTATE, DEXTROAMPHETAMINE SULFATE AND AMPHETAMINE SULFATE 5; 5; 5; 5 MG/1; MG/1; MG/1; MG/1
20 TABLET ORAL DAILY
Qty: 30 TABLET | Refills: 0 | Status: SHIPPED | OUTPATIENT
Start: 2019-03-06 | End: 2019-01-07

## 2019-01-05 RX ORDER — DEXTROAMPHETAMINE SACCHARATE, AMPHETAMINE ASPARTATE, DEXTROAMPHETAMINE SULFATE AND AMPHETAMINE SULFATE 5; 5; 5; 5 MG/1; MG/1; MG/1; MG/1
20 TABLET ORAL DAILY
Qty: 30 TABLET | Refills: 0 | Status: SHIPPED | OUTPATIENT
Start: 2019-02-04 | End: 2019-01-07

## 2019-01-05 RX ORDER — DEXTROAMPHETAMINE SACCHARATE, AMPHETAMINE ASPARTATE, DEXTROAMPHETAMINE SULFATE AND AMPHETAMINE SULFATE 5; 5; 5; 5 MG/1; MG/1; MG/1; MG/1
20 TABLET ORAL DAILY
Qty: 30 TABLET | Refills: 0 | Status: SHIPPED | OUTPATIENT
Start: 2019-01-05 | End: 2019-02-04

## 2019-01-07 ENCOUNTER — APPOINTMENT (OUTPATIENT)
Dept: LAB | Age: 39
End: 2019-01-07
Attending: FAMILY MEDICINE
Payer: COMMERCIAL

## 2019-01-07 ENCOUNTER — OFFICE VISIT (OUTPATIENT)
Dept: FAMILY MEDICINE CLINIC | Facility: CLINIC | Age: 39
End: 2019-01-07

## 2019-01-07 VITALS
SYSTOLIC BLOOD PRESSURE: 138 MMHG | BODY MASS INDEX: 39.23 KG/M2 | HEART RATE: 108 BPM | TEMPERATURE: 99 F | DIASTOLIC BLOOD PRESSURE: 80 MMHG | WEIGHT: 229.81 LBS | HEIGHT: 64 IN | RESPIRATION RATE: 20 BRPM

## 2019-01-07 DIAGNOSIS — E28.2 PCOS (POLYCYSTIC OVARIAN SYNDROME): ICD-10-CM

## 2019-01-07 DIAGNOSIS — F98.8 ATTENTION DEFICIT DISORDER (ADD) WITHOUT HYPERACTIVITY: ICD-10-CM

## 2019-01-07 DIAGNOSIS — Z01.818 PREOP GENERAL PHYSICAL EXAM: ICD-10-CM

## 2019-01-07 DIAGNOSIS — Z00.00 ROUTINE PHYSICAL EXAMINATION: ICD-10-CM

## 2019-01-07 DIAGNOSIS — Z00.00 ROUTINE PHYSICAL EXAMINATION: Primary | ICD-10-CM

## 2019-01-07 DIAGNOSIS — I10 ESSENTIAL HYPERTENSION: ICD-10-CM

## 2019-01-07 DIAGNOSIS — E66.9 NON MORBID OBESITY, UNSPECIFIED OBESITY TYPE: ICD-10-CM

## 2019-01-07 LAB
ANION GAP SERPL CALC-SCNC: 13 MMOL/L (ref 0–18)
BUN SERPL-MCNC: 13 MG/DL (ref 8–20)
BUN/CREAT SERPL: 16.9 (ref 10–20)
CALCIUM SERPL-MCNC: 9.7 MG/DL (ref 8.5–10.5)
CHLORIDE SERPL-SCNC: 100 MMOL/L (ref 95–110)
CHOLEST SERPL-MCNC: 195 MG/DL (ref 110–200)
CO2 SERPL-SCNC: 23 MMOL/L (ref 22–32)
CREAT SERPL-MCNC: 0.77 MG/DL (ref 0.5–1.5)
EST. AVERAGE GLUCOSE BLD GHB EST-MCNC: 120 MG/DL (ref 68–126)
GLUCOSE SERPL-MCNC: 73 MG/DL (ref 70–99)
HBA1C MFR BLD HPLC: 5.8 % (ref ?–5.7)
HDLC SERPL-MCNC: 80 MG/DL
LDLC SERPL CALC-MCNC: 97 MG/DL (ref 0–99)
NONHDLC SERPL-MCNC: 115 MG/DL
OSMOLALITY UR CALC.SUM OF ELEC: 281 MOSM/KG (ref 275–295)
POTASSIUM SERPL-SCNC: 3.6 MMOL/L (ref 3.3–5.1)
SODIUM SERPL-SCNC: 136 MMOL/L (ref 136–144)
TRIGL SERPL-MCNC: 91 MG/DL (ref 1–149)
TSH SERPL-ACNC: 1.5 UIU/ML (ref 0.45–5.33)

## 2019-01-07 PROCEDURE — 87389 HIV-1 AG W/HIV-1&-2 AB AG IA: CPT

## 2019-01-07 PROCEDURE — 84443 ASSAY THYROID STIM HORMONE: CPT

## 2019-01-07 PROCEDURE — 93005 ELECTROCARDIOGRAM TRACING: CPT | Performed by: FAMILY MEDICINE

## 2019-01-07 PROCEDURE — 99395 PREV VISIT EST AGE 18-39: CPT | Performed by: FAMILY MEDICINE

## 2019-01-07 PROCEDURE — 36415 COLL VENOUS BLD VENIPUNCTURE: CPT

## 2019-01-07 PROCEDURE — 80048 BASIC METABOLIC PNL TOTAL CA: CPT

## 2019-01-07 PROCEDURE — 83036 HEMOGLOBIN GLYCOSYLATED A1C: CPT

## 2019-01-07 PROCEDURE — 87491 CHLMYD TRACH DNA AMP PROBE: CPT

## 2019-01-07 PROCEDURE — 87591 N.GONORRHOEAE DNA AMP PROB: CPT

## 2019-01-07 PROCEDURE — 93000 ELECTROCARDIOGRAM COMPLETE: CPT | Performed by: FAMILY MEDICINE

## 2019-01-07 PROCEDURE — 80061 LIPID PANEL: CPT

## 2019-01-07 RX ORDER — MEDROXYPROGESTERONE ACETATE 10 MG/1
10 TABLET ORAL DAILY
Qty: 10 TABLET | Refills: 3 | Status: SHIPPED | OUTPATIENT
Start: 2019-01-07 | End: 2019-01-17

## 2019-01-07 NOTE — PROGRESS NOTES
HPI:    Patient ID: Tommy Peterson is a 43year old female. HPI    Review of Systems   Constitutional: Negative. Respiratory: Negative. Cardiovascular: Negative. Gastrointestinal: Negative. Genitourinary: Positive for menstrual problem.    Sk today.    Nonmorbid obesity–patient is seen Dr. Gino Gutierrez and is planning sleeve gastrectomy. Surgery date will be set at next group session on 1/21/19. Agree with plan, see letter.     Essential hypertension–blood pressure controlled with spironolactone 1

## 2019-01-08 LAB
C TRACH DNA SPEC QL NAA+PROBE: NEGATIVE
HIV1+2 AB SERPL QL IA: NONREACTIVE
N GONORRHOEA DNA SPEC QL NAA+PROBE: NEGATIVE

## 2019-01-15 ENCOUNTER — HOSPITAL ENCOUNTER (OUTPATIENT)
Dept: GENERAL RADIOLOGY | Age: 39
Discharge: HOME | End: 2019-01-15
Attending: FAMILY MEDICINE
Payer: COMMERCIAL

## 2019-01-15 DIAGNOSIS — I10 HTN (HYPERTENSION), BENIGN: ICD-10-CM

## 2019-01-15 DIAGNOSIS — R06.83 SNORING: ICD-10-CM

## 2019-01-15 DIAGNOSIS — E66.9 NON MORBID OBESITY, UNSPECIFIED OBESITY TYPE: ICD-10-CM

## 2019-01-15 PROCEDURE — 71046 X-RAY EXAM CHEST 2 VIEWS: CPT | Performed by: FAMILY MEDICINE

## 2019-02-12 ENCOUNTER — OFFICE VISIT (OUTPATIENT)
Dept: GASTROENTEROLOGY | Facility: CLINIC | Age: 39
End: 2019-02-12
Payer: COMMERCIAL

## 2019-02-12 ENCOUNTER — TELEPHONE (OUTPATIENT)
Dept: FAMILY MEDICINE CLINIC | Facility: CLINIC | Age: 39
End: 2019-02-12

## 2019-02-12 ENCOUNTER — TELEPHONE (OUTPATIENT)
Dept: GASTROENTEROLOGY | Facility: CLINIC | Age: 39
End: 2019-02-12

## 2019-02-12 VITALS
HEART RATE: 112 BPM | SYSTOLIC BLOOD PRESSURE: 140 MMHG | WEIGHT: 232 LBS | HEIGHT: 64 IN | DIASTOLIC BLOOD PRESSURE: 100 MMHG | BODY MASS INDEX: 39.61 KG/M2

## 2019-02-12 DIAGNOSIS — Z01.818 PRE-OPERATIVE CLEARANCE: Primary | ICD-10-CM

## 2019-02-12 DIAGNOSIS — E66.9 NON MORBID OBESITY, UNSPECIFIED OBESITY TYPE: Primary | ICD-10-CM

## 2019-02-12 PROCEDURE — 99243 OFF/OP CNSLTJ NEW/EST LOW 30: CPT | Performed by: PHYSICIAN ASSISTANT

## 2019-02-12 PROCEDURE — 99212 OFFICE O/P EST SF 10 MIN: CPT | Performed by: PHYSICIAN ASSISTANT

## 2019-02-12 RX ORDER — DEXTROAMPHETAMINE SACCHARATE, AMPHETAMINE ASPARTATE, DEXTROAMPHETAMINE SULFATE AND AMPHETAMINE SULFATE 5; 5; 5; 5 MG/1; MG/1; MG/1; MG/1
TABLET ORAL
COMMUNITY
End: 2019-09-14

## 2019-02-12 RX ORDER — MEDROXYPROGESTERONE ACETATE 10 MG/1
10 TABLET ORAL DAILY
COMMUNITY
End: 2021-02-17 | Stop reason: ALTCHOICE

## 2019-02-12 NOTE — TELEPHONE ENCOUNTER
Scheduled for:  EGD 53219  Provider Name: Dr. Nina Cantrell  Date:  2/28/19  Location:  [facility]  Sedation:  MAC  Time:  11:00 am, (pt is aware that [facility] will call the day before to confirm arrival time)  Prep: NPO after midnight  Meds/Allergies Reconciled?:  Lyssa alvarez

## 2019-02-12 NOTE — H&P
[address] [facility] [address] Gastroenterology                                                                                                  Clinic History and Physical     Pa Cancer Maternal Grandmother         throat cancer      Social History: Social History    Tobacco Use      Smoking status: Former Smoker        Types: Cigarettes      Smokeless tobacco: Never Used    Alcohol use:  Yes      Alcohol/week: 0.0 oz      Comment: interactive, and patient is having movements of all 4 extremities   Psych: calm, appropriate    Nursing notes and vitals reviewed. Labs/Imaging:     Patient's labs and imaging were reviewed and discussed with patient today.       ASSESSMENT/PLAN:   Jayme Simental or even death. I also specifically mentioned the miss rate of upper endoscopy of 5-10% in the best of all circumstances. All questions were answered to the patient’s satisfaction.  The patient has agreed to sign an informed consent and elected to proceed wi

## 2019-02-12 NOTE — TELEPHONE ENCOUNTER
Patient called and stated she has an appt today with Dr. Sangita Morris at [place] [address]-     [facility]-    Referral not processed. Informed patient will send request to Dr. La Nena Duenas for approval but may need to reschedule Dr. Herring Speak appt. For future speciality provider appts, patient will need to request referrals. Patient verbally understood.     Please sign off on referral.    Thanks,    Angel

## 2019-02-12 NOTE — PATIENT INSTRUCTIONS
1. Schedule upper endoscopy with MAC anesthesia with Dr. Anni Damico or Ruiz Segovia.     ** If MAC @ [facility]/NE:    - HOLD ACE/ARBs the night before and/or the day of the procedure(s): none    - NO alcohol, recreational drugs nor erectile dysfunction mediations

## 2019-02-26 ENCOUNTER — TELEPHONE (OUTPATIENT)
Dept: GASTROENTEROLOGY | Facility: CLINIC | Age: 39
End: 2019-02-26

## 2019-02-26 DIAGNOSIS — Z01.818 PRE-OPERATIVE CLEARANCE: Primary | ICD-10-CM

## 2019-02-26 NOTE — TELEPHONE ENCOUNTER
Pt needed an EGD for pre op clearance. She is to undergo a sleeve gastrectomy.     Appt cancelled in one medical passport and in EPIC

## 2019-03-21 RX ORDER — SPIRONOLACTONE 100 MG/1
TABLET, FILM COATED ORAL
Qty: 90 TABLET | Refills: 0 | Status: SHIPPED | OUTPATIENT
Start: 2019-03-21 | End: 2019-07-03

## 2019-05-13 ENCOUNTER — PATIENT MESSAGE (OUTPATIENT)
Dept: FAMILY MEDICINE CLINIC | Facility: CLINIC | Age: 39
End: 2019-05-13

## 2019-05-13 RX ORDER — DEXTROAMPHETAMINE SACCHARATE, AMPHETAMINE ASPARTATE, DEXTROAMPHETAMINE SULFATE AND AMPHETAMINE SULFATE 5; 5; 5; 5 MG/1; MG/1; MG/1; MG/1
20 TABLET ORAL DAILY
Qty: 30 TABLET | Refills: 0 | Status: SHIPPED | OUTPATIENT
Start: 2019-05-13 | End: 2019-06-12

## 2019-05-13 RX ORDER — DEXTROAMPHETAMINE SACCHARATE, AMPHETAMINE ASPARTATE, DEXTROAMPHETAMINE SULFATE AND AMPHETAMINE SULFATE 5; 5; 5; 5 MG/1; MG/1; MG/1; MG/1
20 TABLET ORAL DAILY
Qty: 30 TABLET | Refills: 0 | Status: SHIPPED | OUTPATIENT
Start: 2019-06-12 | End: 2019-07-12

## 2019-05-13 RX ORDER — DEXTROAMPHETAMINE SACCHARATE, AMPHETAMINE ASPARTATE, DEXTROAMPHETAMINE SULFATE AND AMPHETAMINE SULFATE 5; 5; 5; 5 MG/1; MG/1; MG/1; MG/1
TABLET ORAL
Refills: 0 | Status: CANCELLED | OUTPATIENT
Start: 2019-05-13

## 2019-05-13 RX ORDER — DEXTROAMPHETAMINE SACCHARATE, AMPHETAMINE ASPARTATE, DEXTROAMPHETAMINE SULFATE AND AMPHETAMINE SULFATE 5; 5; 5; 5 MG/1; MG/1; MG/1; MG/1
20 TABLET ORAL DAILY
Qty: 30 TABLET | Refills: 0 | Status: SHIPPED | OUTPATIENT
Start: 2019-07-12 | End: 2019-08-11

## 2019-05-13 NOTE — TELEPHONE ENCOUNTER
From: Landry Davidson  To: Juliana Collado MD  Sent: 5/13/2019 6:33 AM CDT  Subject: Prescription Question    Dr. Della Pemberton    Will you please refill my prescription for adderall 20mg XL?     Thank you    Chelsie Lo  [phone]

## 2019-05-13 NOTE — TELEPHONE ENCOUNTER
Controlled medication pending for review. If approved needs to be called in or faxed by on-site staff.     Last Rx: 4/10/19  LOV: 1/7/19

## 2019-05-13 NOTE — TELEPHONE ENCOUNTER
Patient requesting refill for     amphetamine-dextroamphetamine (ADDERALL) 20 MG Oral Tab  Disp:  Rfl:      Patient is out.  Please advise

## 2019-06-17 ENCOUNTER — TELEPHONE (OUTPATIENT)
Dept: FAMILY MEDICINE CLINIC | Facility: CLINIC | Age: 39
End: 2019-06-17

## 2019-06-17 RX ORDER — DEXTROAMPHETAMINE SACCHARATE, AMPHETAMINE ASPARTATE, DEXTROAMPHETAMINE SULFATE AND AMPHETAMINE SULFATE 5; 5; 5; 5 MG/1; MG/1; MG/1; MG/1
20 TABLET ORAL DAILY
Qty: 30 TABLET | Refills: 0 | Status: SHIPPED | OUTPATIENT
Start: 2019-08-16 | End: 2019-09-14

## 2019-06-17 RX ORDER — DEXTROAMPHETAMINE SACCHARATE, AMPHETAMINE ASPARTATE, DEXTROAMPHETAMINE SULFATE AND AMPHETAMINE SULFATE 5; 5; 5; 5 MG/1; MG/1; MG/1; MG/1
20 TABLET ORAL DAILY
Qty: 30 TABLET | Refills: 0 | OUTPATIENT
Start: 2019-06-17 | End: 2019-07-17

## 2019-06-17 RX ORDER — DEXTROAMPHETAMINE SACCHARATE, AMPHETAMINE ASPARTATE, DEXTROAMPHETAMINE SULFATE AND AMPHETAMINE SULFATE 5; 5; 5; 5 MG/1; MG/1; MG/1; MG/1
20 TABLET ORAL DAILY
Qty: 30 TABLET | Refills: 0 | Status: SHIPPED | OUTPATIENT
Start: 2019-07-17 | End: 2019-08-16

## 2019-06-17 RX ORDER — DEXTROAMPHETAMINE SACCHARATE, AMPHETAMINE ASPARTATE, DEXTROAMPHETAMINE SULFATE AND AMPHETAMINE SULFATE 5; 5; 5; 5 MG/1; MG/1; MG/1; MG/1
20 TABLET ORAL DAILY
Qty: 30 TABLET | Refills: 0 | Status: SHIPPED | OUTPATIENT
Start: 2019-06-17 | End: 2019-07-17

## 2019-06-17 NOTE — TELEPHONE ENCOUNTER
EM FM OPO LPN/CMA:     Please contact patient if paper scripts are available. Scripts approved by Dr. John Jackson and dated for 6/12/19 and 7/12/19.

## 2019-07-03 RX ORDER — SPIRONOLACTONE 100 MG/1
100 TABLET, FILM COATED ORAL
Qty: 90 TABLET | Refills: 0 | Status: CANCELLED | OUTPATIENT
Start: 2019-07-03

## 2019-07-04 RX ORDER — SPIRONOLACTONE 100 MG/1
100 TABLET, FILM COATED ORAL
Qty: 90 TABLET | Refills: 1 | Status: SHIPPED | OUTPATIENT
Start: 2019-07-04 | End: 2019-09-06

## 2019-07-04 RX ORDER — SPIRONOLACTONE 100 MG/1
TABLET, FILM COATED ORAL
Qty: 90 TABLET | Refills: 0 | OUTPATIENT
Start: 2019-07-04

## 2019-07-04 NOTE — TELEPHONE ENCOUNTER
Duplicate request, previously addressed.         Requested Prescriptions   Refused Prescriptions Disp Refills   • SPIRONOLACTONE 100 MG Oral Tab [Pharmacy Med Name: SPIRONOLACTONE 100MG TABLETS] 90 tablet 0     Sig: TAKE 1 TABLET BY MOUTH EVERY DAY       Hy

## 2019-07-04 NOTE — TELEPHONE ENCOUNTER
Refill passed per [facility] protocol. Requested Prescriptions   Pending Prescriptions Disp Refills   • spironolactone 100 MG Oral Tab 90 tablet 0     Sig: Take 1 tablet (100 mg total) by mouth once daily.        Hypertensive Medications Protocol Pa

## 2019-09-06 ENCOUNTER — PATIENT MESSAGE (OUTPATIENT)
Dept: FAMILY MEDICINE CLINIC | Facility: CLINIC | Age: 39
End: 2019-09-06

## 2019-09-06 RX ORDER — DEXTROAMPHETAMINE SACCHARATE, AMPHETAMINE ASPARTATE, DEXTROAMPHETAMINE SULFATE AND AMPHETAMINE SULFATE 5; 5; 5; 5 MG/1; MG/1; MG/1; MG/1
20 TABLET ORAL DAILY
Qty: 30 TABLET | Refills: 0 | OUTPATIENT
Start: 2019-09-15 | End: 2019-10-15

## 2019-09-06 NOTE — TELEPHONE ENCOUNTER
From: Lucy Davidson  To: Jacky Ordoñez MD  Sent: 9/6/2019 12:31 PM CDT  Subject: Referral Request    Hi Dr. Watson Valdivia! Will you please refill my adderall prescription?      Thank you,  Phyllis Sears

## 2019-09-06 NOTE — TELEPHONE ENCOUNTER
Please advise on refill request.     Refill Protocol Appointment Criteria  · Appointment scheduled in the past 6 months or in the next 3 months  Recent Outpatient Visits            6 months ago Pre-operative clearance    [address], [address] [place] [address],

## 2019-09-07 NOTE — TELEPHONE ENCOUNTER
To reception staff, pls call pt for appt. See MD message, thanks.        Requested Prescriptions     Refused Prescriptions Disp Refills   • amphetamine-dextroamphetamine (ADDERALL) 20 MG Oral Tab 30 tablet 0     Sig: Take 1 tablet (20 mg total) by mouth d

## 2019-09-08 NOTE — TELEPHONE ENCOUNTER
Please review; protocol failed. Requested Prescriptions     Pending Prescriptions Disp Refills   • spironolactone 100 MG Oral Tab 90 tablet 1     Sig: Take 1 tablet (100 mg total) by mouth once daily.          Recent Visits  Date Type Provider Dept   01/

## 2019-09-09 RX ORDER — SPIRONOLACTONE 100 MG/1
100 TABLET, FILM COATED ORAL
Qty: 90 TABLET | Refills: 1 | Status: SHIPPED | OUTPATIENT
Start: 2019-09-09 | End: 2019-11-08

## 2019-09-09 NOTE — TELEPHONE ENCOUNTER
Advised patient on Dr. nAtwon Hui information and recommendations. Patient verbalized understanding. She said she's moved to [facility], hard to get to [facility] unless a weekend, and she regrets leaving Dr Betsy Ron, but will change to closer family provider.

## 2019-09-14 ENCOUNTER — OFFICE VISIT (OUTPATIENT)
Dept: FAMILY MEDICINE CLINIC | Facility: CLINIC | Age: 39
End: 2019-09-14

## 2019-09-14 VITALS
SYSTOLIC BLOOD PRESSURE: 146 MMHG | HEART RATE: 114 BPM | BODY MASS INDEX: 38.24 KG/M2 | HEIGHT: 64 IN | DIASTOLIC BLOOD PRESSURE: 99 MMHG | WEIGHT: 224 LBS

## 2019-09-14 DIAGNOSIS — E28.2 PCOS (POLYCYSTIC OVARIAN SYNDROME): ICD-10-CM

## 2019-09-14 DIAGNOSIS — Z12.31 SCREENING MAMMOGRAM, ENCOUNTER FOR: Primary | ICD-10-CM

## 2019-09-14 DIAGNOSIS — L91.0 KELOID SCAR: ICD-10-CM

## 2019-09-14 DIAGNOSIS — N91.2 AMENORRHEA: ICD-10-CM

## 2019-09-14 LAB
CONTROL LINE PRESENT WITH A CLEAR BACKGROUND (YES/NO): CLEAR YES/NO
KIT LOT #: NORMAL NUMERIC
PREGNANCY TEST, URINE: NEGATIVE

## 2019-09-14 PROCEDURE — 99212 OFFICE O/P EST SF 10 MIN: CPT | Performed by: FAMILY MEDICINE

## 2019-09-14 PROCEDURE — 81025 URINE PREGNANCY TEST: CPT | Performed by: FAMILY MEDICINE

## 2019-09-14 RX ORDER — DEXTROAMPHETAMINE SACCHARATE, AMPHETAMINE ASPARTATE, DEXTROAMPHETAMINE SULFATE AND AMPHETAMINE SULFATE 5; 5; 5; 5 MG/1; MG/1; MG/1; MG/1
20 TABLET ORAL DAILY
Qty: 30 TABLET | Refills: 0 | Status: SHIPPED | OUTPATIENT
Start: 2019-09-14 | End: 2019-10-14

## 2019-09-14 RX ORDER — LABETALOL 100 MG/1
100 TABLET, FILM COATED ORAL 2 TIMES DAILY
Qty: 60 TABLET | Refills: 1 | Status: SHIPPED | OUTPATIENT
Start: 2019-09-14 | End: 2019-11-08

## 2019-09-14 RX ORDER — AMLODIPINE BESYLATE 5 MG/1
TABLET ORAL
Refills: 0 | COMMUNITY
Start: 2019-07-03 | End: 2019-09-14

## 2019-09-14 RX ORDER — DEXTROAMPHETAMINE SACCHARATE, AMPHETAMINE ASPARTATE, DEXTROAMPHETAMINE SULFATE AND AMPHETAMINE SULFATE 5; 5; 5; 5 MG/1; MG/1; MG/1; MG/1
20 TABLET ORAL DAILY
Qty: 30 TABLET | Refills: 0 | Status: SHIPPED | OUTPATIENT
Start: 2019-09-14 | End: 2019-10-15

## 2019-09-14 NOTE — PROGRESS NOTES
Blood pressure (!) 146/99, pulse 114, height 5' 4\" (1.626 m), weight 224 lb (101.6 kg). Patient presents today for an initial visit history of hypertension PCOS. Requesting gynecology has not had a menstrual period for 1 year.   She denies chest pain o

## 2019-10-15 RX ORDER — DEXTROAMPHETAMINE SACCHARATE, AMPHETAMINE ASPARTATE, DEXTROAMPHETAMINE SULFATE AND AMPHETAMINE SULFATE 5; 5; 5; 5 MG/1; MG/1; MG/1; MG/1
20 TABLET ORAL DAILY
Qty: 30 TABLET | Refills: 0 | Status: SHIPPED | OUTPATIENT
Start: 2019-10-15 | End: 2019-11-10

## 2019-11-09 RX ORDER — DEXTROAMPHETAMINE SACCHARATE, AMPHETAMINE ASPARTATE, DEXTROAMPHETAMINE SULFATE AND AMPHETAMINE SULFATE 5; 5; 5; 5 MG/1; MG/1; MG/1; MG/1
20 TABLET ORAL DAILY
Qty: 30 TABLET | Refills: 0 | OUTPATIENT
Start: 2019-11-09

## 2019-11-09 RX ORDER — SPIRONOLACTONE 100 MG/1
100 TABLET, FILM COATED ORAL
Qty: 90 TABLET | Refills: 1 | Status: SHIPPED | OUTPATIENT
Start: 2019-11-09 | End: 2020-01-23

## 2019-11-09 RX ORDER — LABETALOL 100 MG/1
100 TABLET, FILM COATED ORAL 2 TIMES DAILY
Qty: 60 TABLET | Refills: 1 | OUTPATIENT
Start: 2019-11-09

## 2019-11-09 RX ORDER — SPIRONOLACTONE 100 MG/1
100 TABLET, FILM COATED ORAL
Qty: 90 TABLET | Refills: 1 | OUTPATIENT
Start: 2019-11-09

## 2019-11-09 RX ORDER — DEXTROAMPHETAMINE SACCHARATE, AMPHETAMINE ASPARTATE, DEXTROAMPHETAMINE SULFATE AND AMPHETAMINE SULFATE 5; 5; 5; 5 MG/1; MG/1; MG/1; MG/1
20 TABLET ORAL DAILY
Qty: 30 TABLET | Refills: 0 | OUTPATIENT
Start: 2019-11-09 | End: 2019-12-09

## 2019-11-09 RX ORDER — LABETALOL 100 MG/1
TABLET, FILM COATED ORAL
Qty: 180 TABLET | Refills: 1 | Status: SHIPPED | OUTPATIENT
Start: 2019-11-09 | End: 2020-01-07

## 2019-11-09 NOTE — TELEPHONE ENCOUNTER
Please contact patient to make follow-up appointment. Last visit with Dr. Vic Cabrera blood pressure very high, not safe with this medication. Please be sure she has refills for her antihypertensive medications and that she is taking them consistently prior to visit.

## 2019-11-10 NOTE — TELEPHONE ENCOUNTER
Controlled medication pending for review. Please change to phone in, fax, or print script if not being sent electronically.     Last Rx: 10/15/19  LOV: 09/14/19    Requested Prescriptions   Pending Prescriptions Disp Refills   • amphetamine-dextroamphetami

## 2019-11-11 RX ORDER — DEXTROAMPHETAMINE SACCHARATE, AMPHETAMINE ASPARTATE, DEXTROAMPHETAMINE SULFATE AND AMPHETAMINE SULFATE 5; 5; 5; 5 MG/1; MG/1; MG/1; MG/1
20 TABLET ORAL DAILY
Qty: 30 TABLET | Refills: 0 | Status: SHIPPED | OUTPATIENT
Start: 2019-11-11 | End: 2019-12-11

## 2019-11-12 NOTE — TELEPHONE ENCOUNTER
Contacted the patient, she has recently moved out of the city and will be following up with Dr Eliseo Baptiste, who is closer. She has no insurance until after January 1 and is trying to avoid additional tests and appointments until then  She is taking labetelol and spironolactone    Her BP at home tends to run in the 130s/80s.  She will check her BP twice a day for the rest of the week and send her readings into Dr Eliseo Baptiste on [address] and follow his recommendations if he thinks she needs to be   seen before January    Reason for call changed from refill request to condition update

## 2019-12-05 RX ORDER — DEXTROAMPHETAMINE SACCHARATE, AMPHETAMINE ASPARTATE, DEXTROAMPHETAMINE SULFATE AND AMPHETAMINE SULFATE 5; 5; 5; 5 MG/1; MG/1; MG/1; MG/1
20 TABLET ORAL DAILY
Qty: 30 TABLET | Refills: 0 | Status: SHIPPED | OUTPATIENT
Start: 2019-12-05 | End: 2020-01-04

## 2019-12-06 ENCOUNTER — TELEPHONE (OUTPATIENT)
Dept: FAMILY MEDICINE CLINIC | Facility: CLINIC | Age: 39
End: 2019-12-06

## 2019-12-06 RX ORDER — DEXTROAMPHETAMINE SACCHARATE, AMPHETAMINE ASPARTATE, DEXTROAMPHETAMINE SULFATE AND AMPHETAMINE SULFATE 5; 5; 5; 5 MG/1; MG/1; MG/1; MG/1
20 TABLET ORAL DAILY
Qty: 30 TABLET | Refills: 0 | Status: SHIPPED | OUTPATIENT
Start: 2019-12-11 | End: 2020-01-10

## 2019-12-06 RX ORDER — DEXTROAMPHETAMINE SACCHARATE, AMPHETAMINE ASPARTATE, DEXTROAMPHETAMINE SULFATE AND AMPHETAMINE SULFATE 5; 5; 5; 5 MG/1; MG/1; MG/1; MG/1
20 TABLET ORAL DAILY
Qty: 30 TABLET | Refills: 0 | Status: CANCELLED | OUTPATIENT
Start: 2020-02-10 | End: 2020-03-11

## 2019-12-06 RX ORDER — DEXTROAMPHETAMINE SACCHARATE, AMPHETAMINE ASPARTATE, DEXTROAMPHETAMINE SULFATE AND AMPHETAMINE SULFATE 5; 5; 5; 5 MG/1; MG/1; MG/1; MG/1
20 TABLET ORAL DAILY
Qty: 30 TABLET | Refills: 0 | Status: CANCELLED | OUTPATIENT
Start: 2020-01-10 | End: 2020-02-09

## 2019-12-06 RX ORDER — DEXTROAMPHETAMINE SACCHARATE, AMPHETAMINE ASPARTATE, DEXTROAMPHETAMINE SULFATE AND AMPHETAMINE SULFATE 5; 5; 5; 5 MG/1; MG/1; MG/1; MG/1
20 TABLET ORAL DAILY
Qty: 30 TABLET | Refills: 0 | Status: CANCELLED | OUTPATIENT
Start: 2019-12-06 | End: 2020-01-05

## 2019-12-06 NOTE — TELEPHONE ENCOUNTER
Pt called and reschedule her appt to 1/7/19 with Dr JEREMY Guzman. Pt requesting refill on adderall. Tasked to Dr JEREMY Guzman to advise further. The medication is pended for one month.     Please reply to pool: DUSTIN Leos

## 2019-12-06 NOTE — TELEPHONE ENCOUNTER
Confirmed with Agent Ace at [facility] that Adderall prescription cannot be transferred from [facility]. Pt has been notified states she now lives in [facility] and dose not have transportation to go to [facility] to  script. 90 day panel approved yesterday 12/5/19. Pt requesting scripts be re-sent to new pharmacy. Adderall Script pended for approval to go to [facility].

## 2019-12-06 NOTE — TELEPHONE ENCOUNTER
Patient states script for medication amphetamine-dextroamphetamine (ADDERALL) 20 MG Oral Tab was sent to [address] [place] [address]), however patient is requesting that script be resent to the [address] [place] [address] SURGERY [facility]). Please advise.

## 2020-01-07 ENCOUNTER — APPOINTMENT (OUTPATIENT)
Dept: LAB | Age: 40
End: 2020-01-07
Attending: FAMILY MEDICINE
Payer: COMMERCIAL

## 2020-01-07 ENCOUNTER — HOSPITAL ENCOUNTER (OUTPATIENT)
Dept: GENERAL RADIOLOGY | Age: 40
Discharge: HOME | End: 2020-01-07
Attending: FAMILY MEDICINE
Payer: COMMERCIAL

## 2020-01-07 DIAGNOSIS — R06.00 DYSPNEA, UNSPECIFIED TYPE: ICD-10-CM

## 2020-01-07 DIAGNOSIS — F90.9 ATTENTION DEFICIT HYPERACTIVITY DISORDER (ADHD), UNSPECIFIED ADHD TYPE: ICD-10-CM

## 2020-01-07 PROCEDURE — 93010 ELECTROCARDIOGRAM REPORT: CPT | Performed by: FAMILY MEDICINE

## 2020-01-07 PROCEDURE — 93005 ELECTROCARDIOGRAM TRACING: CPT

## 2020-01-07 PROCEDURE — 71046 X-RAY EXAM CHEST 2 VIEWS: CPT | Performed by: FAMILY MEDICINE

## 2020-01-07 NOTE — PROGRESS NOTES
Blood pressure 132/84, pulse (!) 125, height 5' 4\" (1.626 m), weight 234 lb (106.1 kg). Presents today following up for mood issues with attention deficit disorder hypertension PCOS and amenorrhea.   She reports that she is smoking 3 to 4 cigarettes a d

## 2020-01-08 ENCOUNTER — LAB ENCOUNTER (OUTPATIENT)
Dept: LAB | Age: 40
End: 2020-01-08
Attending: FAMILY MEDICINE
Payer: COMMERCIAL

## 2020-01-08 ENCOUNTER — PATIENT MESSAGE (OUTPATIENT)
Dept: FAMILY MEDICINE CLINIC | Facility: CLINIC | Age: 40
End: 2020-01-08

## 2020-01-08 ENCOUNTER — HOSPITAL ENCOUNTER (OUTPATIENT)
Dept: MAMMOGRAPHY | Age: 40
Discharge: HOME | End: 2020-01-08
Attending: FAMILY MEDICINE
Payer: COMMERCIAL

## 2020-01-08 DIAGNOSIS — Z12.31 SCREENING MAMMOGRAM, ENCOUNTER FOR: ICD-10-CM

## 2020-01-08 DIAGNOSIS — N91.2 AMENORRHEA: ICD-10-CM

## 2020-01-08 DIAGNOSIS — I10 ESSENTIAL HYPERTENSION: ICD-10-CM

## 2020-01-08 LAB
ALBUMIN SERPL-MCNC: 4.1 G/DL (ref 3.4–5)
ALBUMIN/GLOB SERPL: 0.9 {RATIO} (ref 1–2)
ALP LIVER SERPL-CCNC: 76 U/L (ref 37–98)
ALT SERPL-CCNC: 19 U/L (ref 13–56)
ANION GAP SERPL CALC-SCNC: 6 MMOL/L (ref 0–18)
AST SERPL-CCNC: 10 U/L (ref 15–37)
BASOPHILS # BLD AUTO: 0.06 X10(3) UL (ref 0–0.2)
BASOPHILS NFR BLD AUTO: 0.8 %
BILIRUB SERPL-MCNC: 0.6 MG/DL (ref 0.1–2)
BUN BLD-MCNC: 12 MG/DL (ref 7–18)
BUN/CREAT SERPL: 13.3 (ref 10–20)
CALCIUM BLD-MCNC: 9.6 MG/DL (ref 8.5–10.1)
CHLORIDE SERPL-SCNC: 105 MMOL/L (ref 98–112)
CHOLEST SMN-MCNC: 172 MG/DL (ref ?–200)
CO2 SERPL-SCNC: 28 MMOL/L (ref 21–32)
CREAT BLD-MCNC: 0.9 MG/DL (ref 0.55–1.02)
DEPRECATED RDW RBC AUTO: 40.3 FL (ref 35.1–46.3)
EOSINOPHIL # BLD AUTO: 0.15 X10(3) UL (ref 0–0.7)
EOSINOPHIL NFR BLD AUTO: 2.1 %
ERYTHROCYTE [DISTWIDTH] IN BLOOD BY AUTOMATED COUNT: 12.1 % (ref 11–15)
FSH SERPL-ACNC: 4.2 MIU/ML
GLOBULIN PLAS-MCNC: 4.5 G/DL (ref 2.8–4.4)
GLUCOSE BLD-MCNC: 107 MG/DL (ref 70–99)
HCT VFR BLD AUTO: 42.2 % (ref 35–48)
HDLC SERPL-MCNC: 67 MG/DL (ref 40–59)
HGB BLD-MCNC: 13.5 G/DL (ref 12–16)
IMM GRANULOCYTES # BLD AUTO: 0.02 X10(3) UL (ref 0–1)
IMM GRANULOCYTES NFR BLD: 0.3 %
LDLC SERPL CALC-MCNC: 93 MG/DL (ref ?–100)
LYMPHOCYTES # BLD AUTO: 2.24 X10(3) UL (ref 1–4)
LYMPHOCYTES NFR BLD AUTO: 30.8 %
M PROTEIN MFR SERPL ELPH: 8.6 G/DL (ref 6.4–8.2)
MCH RBC QN AUTO: 29.2 PG (ref 26–34)
MCHC RBC AUTO-ENTMCNC: 32 G/DL (ref 31–37)
MCV RBC AUTO: 91.1 FL (ref 80–100)
MONOCYTES # BLD AUTO: 0.56 X10(3) UL (ref 0.1–1)
MONOCYTES NFR BLD AUTO: 7.7 %
NEUTROPHILS # BLD AUTO: 4.24 X10 (3) UL (ref 1.5–7.7)
NEUTROPHILS # BLD AUTO: 4.24 X10(3) UL (ref 1.5–7.7)
NEUTROPHILS NFR BLD AUTO: 58.3 %
NONHDLC SERPL-MCNC: 105 MG/DL (ref ?–130)
OSMOLALITY SERPL CALC.SUM OF ELEC: 288 MOSM/KG (ref 275–295)
PATIENT FASTING Y/N/NP: YES
PATIENT FASTING Y/N/NP: YES
PLATELET # BLD AUTO: 331 10(3)UL (ref 150–450)
POTASSIUM SERPL-SCNC: 4.1 MMOL/L (ref 3.5–5.1)
RBC # BLD AUTO: 4.63 X10(6)UL (ref 3.8–5.3)
SODIUM SERPL-SCNC: 139 MMOL/L (ref 136–145)
TRIGL SERPL-MCNC: 61 MG/DL (ref 30–149)
TSI SER-ACNC: 2.35 MIU/ML (ref 0.36–3.74)
VLDLC SERPL CALC-MCNC: 12 MG/DL (ref 0–30)
WBC # BLD AUTO: 7.3 X10(3) UL (ref 4–11)

## 2020-01-08 PROCEDURE — 80053 COMPREHEN METABOLIC PANEL: CPT

## 2020-01-08 PROCEDURE — 77063 BREAST TOMOSYNTHESIS BI: CPT | Performed by: FAMILY MEDICINE

## 2020-01-08 PROCEDURE — 83001 ASSAY OF GONADOTROPIN (FSH): CPT

## 2020-01-08 PROCEDURE — 77067 SCR MAMMO BI INCL CAD: CPT | Performed by: FAMILY MEDICINE

## 2020-01-08 PROCEDURE — 36415 COLL VENOUS BLD VENIPUNCTURE: CPT

## 2020-01-08 PROCEDURE — 80061 LIPID PANEL: CPT

## 2020-01-08 PROCEDURE — 84443 ASSAY THYROID STIM HORMONE: CPT

## 2020-01-08 PROCEDURE — 85025 COMPLETE CBC W/AUTO DIFF WBC: CPT

## 2020-01-08 NOTE — TELEPHONE ENCOUNTER
From: Mia Davidson  To: Loreto Barnard. DO Megan  Sent: 1/8/2020 3:17 PM CST  Subject: Test Results Question    I have a question about [address] resulted on 1/8/20 at 1:08 PM. Is 4.2 normal? Any insight as to non existing menstruation? Thyroid issues?

## 2020-01-23 RX ORDER — SPIRONOLACTONE 100 MG/1
100 TABLET, FILM COATED ORAL
Qty: 90 TABLET | Refills: 0 | Status: SHIPPED | OUTPATIENT
Start: 2020-01-23 | End: 2020-04-25

## 2020-01-23 RX ORDER — DEXTROAMPHETAMINE SACCHARATE, AMPHETAMINE ASPARTATE, DEXTROAMPHETAMINE SULFATE AND AMPHETAMINE SULFATE 5; 5; 5; 5 MG/1; MG/1; MG/1; MG/1
20 TABLET ORAL DAILY
Qty: 30 TABLET | Refills: 0 | Status: SHIPPED | OUTPATIENT
Start: 2020-01-23 | End: 2020-02-26

## 2020-01-23 NOTE — TELEPHONE ENCOUNTER
Controlled medication pending for review. Please change to phone in, fax, or print script if not being sent electronically.     Last Rx: 1/10/20  LOV: Recent Visits  Date Type Provider Dept   01/07/20 Office Visit Elaine Irene DO [facility]   0

## 2020-01-24 ENCOUNTER — OFFICE VISIT (OUTPATIENT)
Dept: OBGYN CLINIC | Facility: CLINIC | Age: 40
End: 2020-01-24
Payer: COMMERCIAL

## 2020-01-24 VITALS
WEIGHT: 238 LBS | HEART RATE: 106 BPM | SYSTOLIC BLOOD PRESSURE: 139 MMHG | BODY MASS INDEX: 41 KG/M2 | DIASTOLIC BLOOD PRESSURE: 92 MMHG

## 2020-01-24 DIAGNOSIS — Z01.419 ENCOUNTER FOR GYNECOLOGICAL EXAMINATION WITHOUT ABNORMAL FINDING: Primary | ICD-10-CM

## 2020-01-24 DIAGNOSIS — I10 ELEVATED BLOOD PRESSURE READING IN OFFICE WITH DIAGNOSIS OF HYPERTENSION: ICD-10-CM

## 2020-01-24 DIAGNOSIS — Z12.31 VISIT FOR SCREENING MAMMOGRAM: ICD-10-CM

## 2020-01-24 DIAGNOSIS — N91.2 AMENORRHEA: ICD-10-CM

## 2020-01-24 PROCEDURE — 99212 OFFICE O/P EST SF 10 MIN: CPT | Performed by: OBSTETRICS & GYNECOLOGY

## 2020-01-24 PROCEDURE — 99386 PREV VISIT NEW AGE 40-64: CPT | Performed by: OBSTETRICS & GYNECOLOGY

## 2020-01-24 NOTE — PROGRESS NOTES
Freya Mays is a 35year old female  No LMP recorded (lmp unknown). Patient is premenopausal.  Patient presents with:  Gyn Exam: Annual    Pt has not had menses for at least 2 years.   She saw her pcp for this and she was given provera but only pi years: 3        Types: Cigarettes      Smokeless tobacco: Never Used    Substance and Sexual Activity      Alcohol use:  Yes        Alcohol/week: 0.0 standard drinks        Frequency: Monthly or less        Comment: Occasionally      Drug use: No      Sexua mouth daily. , Disp: , Rfl:     ALLERGIES:    Azithromycin            HIVES  Sulfa Antibiotics       HIVES    Blood pressure (!) 139/92, pulse 106, weight 238 lb (108 kg).     Review of Systems:  Constitutional:  Denies fatigue, night sweats, hot flashes  Meriam Patient mobility, without tenderness  Adnexa: normal without masses or tenderness  Perineum: normal  Anus: no hemorroids     Assessment & Plan:    Encounter for gynecological examination without abnormal finding  (primary encounter diagnosis)  Amenorrhea  Elevated

## 2020-01-28 ENCOUNTER — HOSPITAL ENCOUNTER (OUTPATIENT)
Dept: MAMMOGRAPHY | Facility: HOSPITAL | Age: 40
Discharge: HOME | End: 2020-01-28
Attending: FAMILY MEDICINE
Payer: COMMERCIAL

## 2020-01-28 DIAGNOSIS — R92.2 INCONCLUSIVE MAMMOGRAM: ICD-10-CM

## 2020-01-29 ENCOUNTER — APPOINTMENT (OUTPATIENT)
Dept: LAB | Age: 40
End: 2020-01-29
Attending: OBSTETRICS & GYNECOLOGY
Payer: COMMERCIAL

## 2020-01-29 ENCOUNTER — HOSPITAL ENCOUNTER (OUTPATIENT)
Dept: ULTRASOUND IMAGING | Age: 40
Discharge: HOME | End: 2020-01-29
Attending: OBSTETRICS & GYNECOLOGY
Payer: COMMERCIAL

## 2020-01-29 DIAGNOSIS — N91.2 AMENORRHEA: ICD-10-CM

## 2020-01-29 LAB
ESTRADIOL SERPL-MCNC: 82.2 PG/ML
FSH SERPL-ACNC: 2.1 MIU/ML
HCG SERPL QL: NEGATIVE
LH SERPL-ACNC: 0.4 MIU/ML

## 2020-01-29 PROCEDURE — 76856 US EXAM PELVIC COMPLETE: CPT | Performed by: OBSTETRICS & GYNECOLOGY

## 2020-01-29 PROCEDURE — 84703 CHORIONIC GONADOTROPIN ASSAY: CPT

## 2020-01-29 PROCEDURE — 82670 ASSAY OF TOTAL ESTRADIOL: CPT

## 2020-01-29 PROCEDURE — 36415 COLL VENOUS BLD VENIPUNCTURE: CPT

## 2020-01-29 PROCEDURE — 83002 ASSAY OF GONADOTROPIN (LH): CPT

## 2020-01-29 PROCEDURE — 83001 ASSAY OF GONADOTROPIN (FSH): CPT

## 2020-01-29 PROCEDURE — 76830 TRANSVAGINAL US NON-OB: CPT | Performed by: OBSTETRICS & GYNECOLOGY

## 2020-02-26 RX ORDER — DEXTROAMPHETAMINE SACCHARATE, AMPHETAMINE ASPARTATE, DEXTROAMPHETAMINE SULFATE AND AMPHETAMINE SULFATE 5; 5; 5; 5 MG/1; MG/1; MG/1; MG/1
20 TABLET ORAL DAILY
Qty: 30 TABLET | Refills: 0 | Status: SHIPPED | OUTPATIENT
Start: 2020-02-26 | End: 2020-03-25

## 2020-02-26 RX ORDER — DEXTROAMPHETAMINE SACCHARATE, AMPHETAMINE ASPARTATE, DEXTROAMPHETAMINE SULFATE AND AMPHETAMINE SULFATE 5; 5; 5; 5 MG/1; MG/1; MG/1; MG/1
20 TABLET ORAL DAILY
Qty: 30 TABLET | Refills: 0 | OUTPATIENT
Start: 2020-02-26 | End: 2020-03-27

## 2020-02-26 RX ORDER — LABETALOL 100 MG/1
TABLET, FILM COATED ORAL
Qty: 60 TABLET | Refills: 1 | OUTPATIENT
Start: 2020-02-26

## 2020-02-26 RX ORDER — AMLODIPINE BESYLATE 10 MG/1
10 TABLET ORAL DAILY
Qty: 90 TABLET | Refills: 1 | Status: SHIPPED | OUTPATIENT
Start: 2020-02-26 | End: 2020-05-24

## 2020-02-26 NOTE — TELEPHONE ENCOUNTER
Sent to provider to review, medication listed as discontinued     Will file in chart as: LABETALOL  MG Oral Tab     The source prescription was discontinued on 11/9/2019 by Adia Espinosa RN.

## 2020-02-26 NOTE — TELEPHONE ENCOUNTER
Pt calling for refills. Please advise. Pt states Labetalol was discontinued due to side effects and she was restarted on Amlodipine. Amlodipine listed as historical on med list.  Please advise.        LR for Adderall 1/7/19       Hypertensive Medications

## 2020-02-26 NOTE — TELEPHONE ENCOUNTER
Spoke to patient and requesting script for ADDERALL be sent electronically since she lives in [facility] and will not have time to come to [facility]     Medication pended again and printed script was shredded.

## 2020-03-03 ENCOUNTER — OFFICE VISIT (OUTPATIENT)
Dept: OBGYN CLINIC | Facility: CLINIC | Age: 40
End: 2020-03-03
Payer: COMMERCIAL

## 2020-03-03 ENCOUNTER — LAB ENCOUNTER (OUTPATIENT)
Dept: LAB | Facility: HOSPITAL | Age: 40
End: 2020-03-03
Attending: OBSTETRICS & GYNECOLOGY
Payer: COMMERCIAL

## 2020-03-03 VITALS
SYSTOLIC BLOOD PRESSURE: 144 MMHG | WEIGHT: 233 LBS | DIASTOLIC BLOOD PRESSURE: 90 MMHG | HEART RATE: 118 BPM | BODY MASS INDEX: 40 KG/M2

## 2020-03-03 DIAGNOSIS — N91.2 AMENORRHEA: Primary | ICD-10-CM

## 2020-03-03 DIAGNOSIS — N91.2 AMENORRHEA: ICD-10-CM

## 2020-03-03 DIAGNOSIS — N94.9 VAGINAL LUMP: ICD-10-CM

## 2020-03-03 LAB
ESTRADIOL SERPL-MCNC: 33.7 PG/ML
FSH SERPL-ACNC: 2.7 MIU/ML
LH SERPL-ACNC: 0.7 MIU/ML

## 2020-03-03 PROCEDURE — 83002 ASSAY OF GONADOTROPIN (LH): CPT

## 2020-03-03 PROCEDURE — 82670 ASSAY OF TOTAL ESTRADIOL: CPT

## 2020-03-03 PROCEDURE — 99214 OFFICE O/P EST MOD 30 MIN: CPT | Performed by: OBSTETRICS & GYNECOLOGY

## 2020-03-03 PROCEDURE — 83001 ASSAY OF GONADOTROPIN (FSH): CPT

## 2020-03-03 PROCEDURE — 36415 COLL VENOUS BLD VENIPUNCTURE: CPT

## 2020-03-03 NOTE — PROGRESS NOTES
Jeramy VELASQUEZ 1980       Patient presents with: Follow - Up: test results  pt here to discuss hormonal tests. Her menopausal hormone levels are inconclusive.   I recommended that we repeat them but also that we do an EMB since she has been a prominent hymenal remnant but no abnormalities seen- pt reassured  Cervix:  Normal without tenderness on motion  Uterus: normal in size, contour, position, mobility, without tenderness  Adnexa: normal without masses or tenderness  Perineum: normal  Anus: n

## 2020-03-26 RX ORDER — DEXTROAMPHETAMINE SACCHARATE, AMPHETAMINE ASPARTATE, DEXTROAMPHETAMINE SULFATE AND AMPHETAMINE SULFATE 5; 5; 5; 5 MG/1; MG/1; MG/1; MG/1
20 TABLET ORAL DAILY
Qty: 30 TABLET | Refills: 0 | Status: SHIPPED | OUTPATIENT
Start: 2020-03-26 | End: 2020-04-27

## 2020-04-25 RX ORDER — DEXTROAMPHETAMINE SACCHARATE, AMPHETAMINE ASPARTATE, DEXTROAMPHETAMINE SULFATE AND AMPHETAMINE SULFATE 5; 5; 5; 5 MG/1; MG/1; MG/1; MG/1
20 TABLET ORAL DAILY
Qty: 30 TABLET | Refills: 0 | Status: CANCELLED | OUTPATIENT
Start: 2020-04-25 | End: 2020-05-25

## 2020-04-27 ENCOUNTER — PATIENT MESSAGE (OUTPATIENT)
Dept: OBGYN CLINIC | Facility: CLINIC | Age: 40
End: 2020-04-27

## 2020-04-27 RX ORDER — DEXTROAMPHETAMINE SACCHARATE, AMPHETAMINE ASPARTATE, DEXTROAMPHETAMINE SULFATE AND AMPHETAMINE SULFATE 5; 5; 5; 5 MG/1; MG/1; MG/1; MG/1
20 TABLET ORAL DAILY
Qty: 30 TABLET | Refills: 0 | Status: SHIPPED | OUTPATIENT
Start: 2020-04-27 | End: 2020-05-24

## 2020-04-27 RX ORDER — SPIRONOLACTONE 100 MG/1
100 TABLET, FILM COATED ORAL
Qty: 90 TABLET | Refills: 0 | Status: SHIPPED | OUTPATIENT
Start: 2020-04-27 | End: 2020-10-26

## 2020-04-27 NOTE — TELEPHONE ENCOUNTER
From: Lexi Davidson  To: Rock Mauricio Torres MD  Sent: 4/27/2020 9:25 AM CDT  Subject: Non-Urgent Medical Question    Dr Kathy Camacho,    Due to the recent Virus, I have not been able to have my biopsy completed. As of today, I haven't had my period for 2.5 years.  Noemi Barrientos

## 2020-04-27 NOTE — TELEPHONE ENCOUNTER
This is being sent to you without review by the Triage staff due to the high call volumes created by the COVID-19 virus, per the email sent by Dr. Kiara Hughes on 3/19/20. Thank you for your support.     Centralized Nurse Triage Team

## 2020-04-27 NOTE — TELEPHONE ENCOUNTER
We still need to do an EMB before we can determine next step or if any further inetervention needed. Please schedule pt visit in May for her EMB at a time that is convenient to her. Any time after may 4th.

## 2020-04-27 NOTE — TELEPHONE ENCOUNTER
Patient is requesting a refill. She stated she is out of medication. amphetamine-dextroamphetamine (ADDERALL) 20 MG Oral Tab () 30 tablet 0 3/26/2020 2020   Sig:   Take 1 tablet (20 mg total) by mouth daily.      Route:   Oral     Earliest Fi

## 2020-04-29 NOTE — TELEPHONE ENCOUNTER
Scheduled pt for [facility] on 5/13, wondering if pap can be done same day.  Please advise

## 2020-04-30 NOTE — TELEPHONE ENCOUNTER
Pt will need a separate appt for her annual exam and pap, the emb is a procedure and cannot be billed for on the same visit as a preventative exam.

## 2020-05-15 ENCOUNTER — TELEPHONE (OUTPATIENT)
Dept: OBGYN CLINIC | Facility: CLINIC | Age: 40
End: 2020-05-15

## 2020-05-15 ENCOUNTER — OFFICE VISIT (OUTPATIENT)
Dept: OBGYN CLINIC | Facility: CLINIC | Age: 40
End: 2020-05-15
Payer: COMMERCIAL

## 2020-05-15 VITALS
WEIGHT: 233.81 LBS | SYSTOLIC BLOOD PRESSURE: 135 MMHG | HEART RATE: 109 BPM | DIASTOLIC BLOOD PRESSURE: 84 MMHG | BODY MASS INDEX: 40 KG/M2

## 2020-05-15 DIAGNOSIS — N83.201 RIGHT OVARIAN CYST: ICD-10-CM

## 2020-05-15 DIAGNOSIS — N92.0 EXCESSIVE OR FREQUENT MENSTRUATION: Primary | ICD-10-CM

## 2020-05-15 PROBLEM — N91.2 AMENORRHEA: Status: ACTIVE | Noted: 2020-05-15

## 2020-05-15 PROCEDURE — 58100 BIOPSY OF UTERUS LINING: CPT | Performed by: OBSTETRICS & GYNECOLOGY

## 2020-05-15 NOTE — TELEPHONE ENCOUNTER
I just saw pt today for her EMB and forgot to mention to remind her while she was here about her f/u pelvic US to check on the resolution of her right ovarian cyst seen on her [address] in January.   The order is in the system and she should be able to schedule th

## 2020-05-15 NOTE — TELEPHONE ENCOUNTER
Informed pt of CAP recs below. Provided pt with phone number to schedule. Pt verbalized understanding.

## 2020-05-17 ENCOUNTER — HOSPITAL ENCOUNTER (OUTPATIENT)
Dept: ULTRASOUND IMAGING | Age: 40
Discharge: HOME | End: 2020-05-17
Attending: OBSTETRICS & GYNECOLOGY
Payer: COMMERCIAL

## 2020-05-17 DIAGNOSIS — N83.201 RIGHT OVARIAN CYST: ICD-10-CM

## 2020-05-17 PROCEDURE — 76830 TRANSVAGINAL US NON-OB: CPT | Performed by: OBSTETRICS & GYNECOLOGY

## 2020-05-17 PROCEDURE — 76856 US EXAM PELVIC COMPLETE: CPT | Performed by: OBSTETRICS & GYNECOLOGY

## 2020-05-22 ENCOUNTER — TELEPHONE (OUTPATIENT)
Dept: OBGYN CLINIC | Facility: CLINIC | Age: 40
End: 2020-05-22

## 2020-05-22 NOTE — TELEPHONE ENCOUNTER
----- Message from Jessee Castellanos MD sent at 5/19/2020 10:50 AM CDT -----  Right ovarian cyst has resolved and she has a normal ovulation cyst on the left.

## 2020-05-22 NOTE — TELEPHONE ENCOUNTER
Informed pt of both results below, (U/S & EMBX). Pt asking for next steps from CAP. Informed pt message will be sent to CAP and she will return to office on 5/26/2020. Pt verbalized understanding.      CAP per 5/15/2020 notes- \"I will speak to her about st

## 2020-05-26 RX ORDER — AMLODIPINE BESYLATE 10 MG/1
10 TABLET ORAL DAILY
Qty: 90 TABLET | Refills: 1 | Status: SHIPPED | OUTPATIENT
Start: 2020-05-26 | End: 2020-08-24

## 2020-05-26 RX ORDER — DEXTROAMPHETAMINE SACCHARATE, AMPHETAMINE ASPARTATE, DEXTROAMPHETAMINE SULFATE AND AMPHETAMINE SULFATE 5; 5; 5; 5 MG/1; MG/1; MG/1; MG/1
20 TABLET ORAL DAILY
Qty: 30 TABLET | Refills: 0 | Status: SHIPPED | OUTPATIENT
Start: 2020-05-26 | End: 2020-06-25

## 2020-05-26 NOTE — TELEPHONE ENCOUNTER
Per YADI will be doing phone/virtual visits on 5/28 8-noon NOT Wednesday. Pt informed of CAP's msg per below and phone appt for Thursday. Scheduled not open yet. Per Raul Vasquez send her appt time and she will add pt.  Pt accepted phone visit at 10am. Will be in

## 2020-05-26 NOTE — TELEPHONE ENCOUNTER
Please schedule pt for a video visit if possible. I will be available for video/phone visits Wednesday from 8-12.

## 2020-05-28 ENCOUNTER — VIRTUAL PHONE E/M (OUTPATIENT)
Dept: OBGYN CLINIC | Facility: CLINIC | Age: 40
End: 2020-05-28
Payer: COMMERCIAL

## 2020-05-28 DIAGNOSIS — N91.1 AMENORRHEA, SECONDARY: Primary | ICD-10-CM

## 2020-05-28 PROCEDURE — 99213 OFFICE O/P EST LOW 20 MIN: CPT | Performed by: OBSTETRICS & GYNECOLOGY

## 2020-05-28 RX ORDER — ACETAMINOPHEN AND CODEINE PHOSPHATE 120; 12 MG/5ML; MG/5ML
0.35 SOLUTION ORAL DAILY
Qty: 3 PACKAGE | Refills: 0 | Status: SHIPPED | OUTPATIENT
Start: 2020-05-28 | End: 2021-02-17 | Stop reason: ALTCHOICE

## 2020-05-28 NOTE — PROGRESS NOTES
Virtual Telephone Check-In    Mia Davidson verbally consents to a Virtual/Telephone Check-In visit on 05/28/20. Patient has been referred to the [facility] website at [email or web address] to review the yearly Consent to Treat document.     Patient Josephine Cantu Besylate 10 MG Oral Tab, Take 1 tablet (10 mg total) by mouth daily. , Disp: 90 tablet, Rfl: 1  amphetamine-dextroamphetamine (ADDERALL) 20 MG Oral Tab, Take 1 tablet (20 mg total) by mouth daily. , Disp: 30 tablet, Rfl: 0  amLODIPine Besylate 10 MG Oral Tab

## 2020-06-26 RX ORDER — DEXTROAMPHETAMINE SACCHARATE, AMPHETAMINE ASPARTATE, DEXTROAMPHETAMINE SULFATE AND AMPHETAMINE SULFATE 5; 5; 5; 5 MG/1; MG/1; MG/1; MG/1
20 TABLET ORAL DAILY
Qty: 30 TABLET | Refills: 0 | OUTPATIENT
Start: 2020-06-26 | End: 2020-07-26

## 2020-06-26 NOTE — PROGRESS NOTES
HPI: HPI    I spoke Silvia Mathews by telephone , verified date of birth, and discussed current concerns. Patient requests a refill of Adderral. Patient has been taking for the past 4 years. Patient is doing fine at this time.  Patient verbalized under Family History:     Family History   Adopted: Yes   Problem Relation Age of Onset   • Cancer Maternal Grandmother         throat cancer       Social History:   Social History    Socioeconomic History      Marital status: Single      Spouse name: Not pacemaker: Not Asked        Pt has a defibrillator: Not Asked        Breast feeding: Not Asked        Reaction to local anesthetic: No    Social History Narrative      Not on file      Review of Systems:   Review of Systems   Constitutional: Negative for a Stuart was informed and agrees that this telemedicine visit will charged. Lexi Davidson expresses understanding, accepts these limitations, and agrees to phone evaluation as documented above.  Lexi Davidson understands phone evaluation is not a substitute

## 2020-09-11 DIAGNOSIS — F98.8 ADD (ATTENTION DEFICIT DISORDER) WITHOUT HYPERACTIVITY: ICD-10-CM

## 2020-09-11 RX ORDER — DEXTROAMPHETAMINE SACCHARATE, AMPHETAMINE ASPARTATE, DEXTROAMPHETAMINE SULFATE AND AMPHETAMINE SULFATE 5; 5; 5; 5 MG/1; MG/1; MG/1; MG/1
20 TABLET ORAL DAILY
Qty: 30 TABLET | Refills: 0 | Status: SHIPPED | OUTPATIENT
Start: 2020-09-11 | End: 2020-10-11

## 2020-10-24 DIAGNOSIS — I10 ESSENTIAL HYPERTENSION: Primary | ICD-10-CM

## 2020-10-25 ENCOUNTER — PATIENT MESSAGE (OUTPATIENT)
Dept: FAMILY MEDICINE CLINIC | Facility: CLINIC | Age: 40
End: 2020-10-25

## 2020-10-26 RX ORDER — DEXTROAMPHETAMINE SACCHARATE, AMPHETAMINE ASPARTATE, DEXTROAMPHETAMINE SULFATE AND AMPHETAMINE SULFATE 5; 5; 5; 5 MG/1; MG/1; MG/1; MG/1
20 TABLET ORAL DAILY
Qty: 30 TABLET | Refills: 0 | Status: SHIPPED | OUTPATIENT
Start: 2020-10-26 | End: 2020-11-25

## 2020-10-26 RX ORDER — DEXTROAMPHETAMINE SACCHARATE, AMPHETAMINE ASPARTATE, DEXTROAMPHETAMINE SULFATE AND AMPHETAMINE SULFATE 5; 5; 5; 5 MG/1; MG/1; MG/1; MG/1
20 TABLET ORAL DAILY
Qty: 30 TABLET | Refills: 0 | Status: SHIPPED | OUTPATIENT
Start: 2020-12-26 | End: 2021-01-20

## 2020-10-26 RX ORDER — AMLODIPINE BESYLATE 10 MG/1
10 TABLET ORAL DAILY
Qty: 90 TABLET | Refills: 1 | Status: SHIPPED | OUTPATIENT
Start: 2020-10-26 | End: 2021-01-20

## 2020-10-26 RX ORDER — SPIRONOLACTONE 100 MG/1
100 TABLET, FILM COATED ORAL DAILY
Qty: 90 TABLET | Refills: 0 | Status: SHIPPED | OUTPATIENT
Start: 2020-10-26 | End: 2021-01-20

## 2020-10-26 RX ORDER — SPIRONOLACTONE 100 MG/1
100 TABLET, FILM COATED ORAL
Qty: 90 TABLET | Refills: 0 | OUTPATIENT
Start: 2020-10-26

## 2020-10-26 RX ORDER — DEXTROAMPHETAMINE SACCHARATE, AMPHETAMINE ASPARTATE, DEXTROAMPHETAMINE SULFATE AND AMPHETAMINE SULFATE 5; 5; 5; 5 MG/1; MG/1; MG/1; MG/1
20 TABLET ORAL DAILY
Qty: 30 TABLET | Refills: 0 | Status: SHIPPED | OUTPATIENT
Start: 2020-11-25 | End: 2020-12-25

## 2020-10-26 NOTE — TELEPHONE ENCOUNTER
From: Ade Davidson  To: Uri Sorto Friend,   Sent: 10/25/2020 2:33 PM CDT  Subject: Prescription Question    Hi Dr. Sorto Friend! I called in requesting refills for my Amlodipine and Adderall. Can you please send in 3mths of refills for both?  For some reas

## 2020-10-26 NOTE — TELEPHONE ENCOUNTER
Scripts pended and routed to provider, noted pt has sent a [employer] regarding scripts to [place] [id number]

## 2021-01-01 RX ORDER — DEXTROAMPHETAMINE SACCHARATE, AMPHETAMINE ASPARTATE, DEXTROAMPHETAMINE SULFATE AND AMPHETAMINE SULFATE 5; 5; 5; 5 MG/1; MG/1; MG/1; MG/1
20 TABLET ORAL DAILY
Qty: 30 TABLET | Refills: 0 | OUTPATIENT
Start: 2021-01-01 | End: 2021-01-31

## 2021-01-16 PROBLEM — F33.1 MDD (MAJOR DEPRESSIVE DISORDER), RECURRENT EPISODE, MODERATE (HCC): Status: ACTIVE | Noted: 2021-01-16

## 2021-01-16 PROBLEM — F41.1 GAD (GENERALIZED ANXIETY DISORDER): Status: ACTIVE | Noted: 2021-01-16

## 2021-01-21 RX ORDER — AMLODIPINE BESYLATE 10 MG/1
10 TABLET ORAL DAILY
Qty: 90 TABLET | Refills: 1 | OUTPATIENT
Start: 2021-01-21

## 2021-01-21 RX ORDER — AMLODIPINE BESYLATE 10 MG/1
10 TABLET ORAL DAILY
Qty: 90 TABLET | Refills: 0 | Status: SHIPPED | OUTPATIENT
Start: 2021-01-21 | End: 2021-02-17

## 2021-01-21 RX ORDER — SPIRONOLACTONE 100 MG/1
100 TABLET, FILM COATED ORAL DAILY
Qty: 30 TABLET | Refills: 0 | Status: SHIPPED | OUTPATIENT
Start: 2021-01-21 | End: 2021-02-17

## 2021-01-21 RX ORDER — DEXTROAMPHETAMINE SACCHARATE, AMPHETAMINE ASPARTATE, DEXTROAMPHETAMINE SULFATE AND AMPHETAMINE SULFATE 5; 5; 5; 5 MG/1; MG/1; MG/1; MG/1
20 TABLET ORAL DAILY
Qty: 30 TABLET | Refills: 0 | Status: SHIPPED | OUTPATIENT
Start: 2021-01-21 | End: 2021-02-20

## 2021-01-21 RX ORDER — SPIRONOLACTONE 100 MG/1
100 TABLET, FILM COATED ORAL DAILY
Qty: 90 TABLET | Refills: 0 | OUTPATIENT
Start: 2021-01-21

## 2021-01-21 RX ORDER — DEXTROAMPHETAMINE SACCHARATE, AMPHETAMINE ASPARTATE, DEXTROAMPHETAMINE SULFATE AND AMPHETAMINE SULFATE 5; 5; 5; 5 MG/1; MG/1; MG/1; MG/1
20 TABLET ORAL DAILY
Qty: 30 TABLET | Refills: 0 | OUTPATIENT
Start: 2021-01-21 | End: 2021-02-20

## 2021-02-17 ENCOUNTER — OFFICE VISIT (OUTPATIENT)
Dept: INTERNAL MEDICINE CLINIC | Facility: CLINIC | Age: 41
End: 2021-02-17
Payer: COMMERCIAL

## 2021-02-17 VITALS
TEMPERATURE: 99 F | DIASTOLIC BLOOD PRESSURE: 86 MMHG | HEIGHT: 66 IN | SYSTOLIC BLOOD PRESSURE: 132 MMHG | WEIGHT: 240 LBS | OXYGEN SATURATION: 100 % | HEART RATE: 108 BPM | RESPIRATION RATE: 18 BRPM | BODY MASS INDEX: 38.57 KG/M2

## 2021-02-17 DIAGNOSIS — R41.3 MEMORY CHANGES: ICD-10-CM

## 2021-02-17 DIAGNOSIS — R94.31 ABNORMAL EKG: ICD-10-CM

## 2021-02-17 DIAGNOSIS — R63.5 WEIGHT GAIN: ICD-10-CM

## 2021-02-17 DIAGNOSIS — R06.00 DOE (DYSPNEA ON EXERTION): ICD-10-CM

## 2021-02-17 DIAGNOSIS — I10 ESSENTIAL HYPERTENSION: Primary | ICD-10-CM

## 2021-02-17 PROBLEM — R06.09 DOE (DYSPNEA ON EXERTION): Status: ACTIVE | Noted: 2021-02-17

## 2021-02-17 PROCEDURE — 99204 OFFICE O/P NEW MOD 45 MIN: CPT | Performed by: INTERNAL MEDICINE

## 2021-02-17 PROCEDURE — 93000 ELECTROCARDIOGRAM COMPLETE: CPT | Performed by: INTERNAL MEDICINE

## 2021-02-17 PROCEDURE — 3008F BODY MASS INDEX DOCD: CPT | Performed by: INTERNAL MEDICINE

## 2021-02-17 PROCEDURE — 3075F SYST BP GE 130 - 139MM HG: CPT | Performed by: INTERNAL MEDICINE

## 2021-02-17 PROCEDURE — 3079F DIAST BP 80-89 MM HG: CPT | Performed by: INTERNAL MEDICINE

## 2021-02-17 RX ORDER — METOPROLOL SUCCINATE 25 MG/1
25 TABLET, EXTENDED RELEASE ORAL DAILY
Qty: 30 TABLET | Refills: 2 | Status: SHIPPED | OUTPATIENT
Start: 2021-02-17 | End: 2021-05-18

## 2021-02-17 RX ORDER — AMLODIPINE BESYLATE 10 MG/1
10 TABLET ORAL DAILY
Qty: 90 TABLET | Refills: 1 | Status: SHIPPED | OUTPATIENT
Start: 2021-02-17 | End: 2021-05-18

## 2021-02-17 RX ORDER — SPIRONOLACTONE 100 MG/1
100 TABLET, FILM COATED ORAL DAILY
Qty: 90 TABLET | Refills: 1 | Status: SHIPPED | OUTPATIENT
Start: 2021-02-17 | End: 2021-05-18

## 2021-02-17 NOTE — PROGRESS NOTES
Madhuri Lang is a 38year old female.   Patient presents with:  Establish Care: RG rm 3 New pt discuss hypertenstion and getting hormone labs donen  Memory Loss: memory loss, foggy brain, speech issues      HPI:     Patient referred to me by her therapist spironolactone 100 MG Oral Tab Take 1 tablet (100 mg total) by mouth daily. 90 tablet 1   • amLODIPine Besylate 10 MG Oral Tab Take 1 tablet (10 mg total) by mouth daily.  90 tablet 1   • amphetamine-dextroamphetamine (ADDERALL) 20 MG Oral Tab Take 1 tablet or edema  NEURO: Alert and oriented, no focal deficits    EKG: NSR with PACs, nonspecific T wave abnormalities    ASSESSMENT AND PLAN:      Essential hypertension- controlled but pt taking amlodipine 20mg daily along with spironolactone 100mg daily.  Discus

## 2021-02-20 ENCOUNTER — LAB ENCOUNTER (OUTPATIENT)
Dept: LAB | Facility: HOSPITAL | Age: 41
End: 2021-02-20
Attending: INTERNAL MEDICINE
Payer: COMMERCIAL

## 2021-02-20 DIAGNOSIS — R94.31 ABNORMAL EKG: ICD-10-CM

## 2021-02-20 DIAGNOSIS — R06.00 DOE (DYSPNEA ON EXERTION): ICD-10-CM

## 2021-02-21 LAB — SARS-COV-2 RNA RESP QL NAA+PROBE: NOT DETECTED

## 2021-02-22 ENCOUNTER — LAB ENCOUNTER (OUTPATIENT)
Dept: LAB | Age: 41
End: 2021-02-22
Attending: INTERNAL MEDICINE
Payer: COMMERCIAL

## 2021-02-22 DIAGNOSIS — I10 ESSENTIAL HYPERTENSION: ICD-10-CM

## 2021-02-22 DIAGNOSIS — R63.5 WEIGHT GAIN: ICD-10-CM

## 2021-02-22 DIAGNOSIS — R41.3 MEMORY CHANGES: ICD-10-CM

## 2021-02-22 LAB
ALBUMIN SERPL-MCNC: 4.1 G/DL (ref 3.4–5)
ALBUMIN/GLOB SERPL: 1 {RATIO} (ref 1–2)
ALP LIVER SERPL-CCNC: 90 U/L
ALT SERPL-CCNC: 28 U/L
ANION GAP SERPL CALC-SCNC: 6 MMOL/L (ref 0–18)
AST SERPL-CCNC: 13 U/L (ref 15–37)
BILIRUB SERPL-MCNC: 0.3 MG/DL (ref 0.1–2)
BUN BLD-MCNC: 10 MG/DL (ref 7–18)
BUN/CREAT SERPL: 10.2 (ref 10–20)
CALCIUM BLD-MCNC: 9.5 MG/DL (ref 8.5–10.1)
CHLORIDE SERPL-SCNC: 105 MMOL/L (ref 98–112)
CHOLEST SMN-MCNC: 200 MG/DL (ref ?–200)
CO2 SERPL-SCNC: 26 MMOL/L (ref 21–32)
CORTIS SERPL-MCNC: 7.3 UG/DL
CREAT BLD-MCNC: 0.98 MG/DL
GLOBULIN PLAS-MCNC: 4.1 G/DL (ref 2.8–4.4)
GLUCOSE BLD-MCNC: 107 MG/DL (ref 70–99)
HDLC SERPL-MCNC: 67 MG/DL (ref 40–59)
LDLC SERPL CALC-MCNC: 123 MG/DL (ref ?–100)
M PROTEIN MFR SERPL ELPH: 8.2 G/DL (ref 6.4–8.2)
NONHDLC SERPL-MCNC: 133 MG/DL (ref ?–130)
OSMOLALITY SERPL CALC.SUM OF ELEC: 284 MOSM/KG (ref 275–295)
PATIENT FASTING Y/N/NP: YES
PATIENT FASTING Y/N/NP: YES
POTASSIUM SERPL-SCNC: 4.3 MMOL/L (ref 3.5–5.1)
SODIUM SERPL-SCNC: 137 MMOL/L (ref 136–145)
T4 FREE SERPL-MCNC: 0.9 NG/DL (ref 0.8–1.7)
TRIGL SERPL-MCNC: 49 MG/DL (ref 30–149)
TSI SER-ACNC: 1.61 MIU/ML (ref 0.36–3.74)
VIT B12 SERPL-MCNC: 414 PG/ML (ref 193–986)
VLDLC SERPL CALC-MCNC: 10 MG/DL (ref 0–30)

## 2021-02-22 PROCEDURE — 82533 TOTAL CORTISOL: CPT | Performed by: INTERNAL MEDICINE

## 2021-03-26 ENCOUNTER — OFFICE VISIT (OUTPATIENT)
Dept: OBGYN CLINIC | Facility: CLINIC | Age: 41
End: 2021-03-26
Payer: COMMERCIAL

## 2021-03-26 VITALS
WEIGHT: 241 LBS | HEART RATE: 96 BPM | BODY MASS INDEX: 39 KG/M2 | DIASTOLIC BLOOD PRESSURE: 83 MMHG | SYSTOLIC BLOOD PRESSURE: 126 MMHG

## 2021-03-26 DIAGNOSIS — Z01.419 ENCOUNTER FOR GYNECOLOGICAL EXAMINATION WITHOUT ABNORMAL FINDING: Primary | ICD-10-CM

## 2021-03-26 DIAGNOSIS — Z12.31 VISIT FOR SCREENING MAMMOGRAM: ICD-10-CM

## 2021-03-26 DIAGNOSIS — Z12.4 SCREENING FOR MALIGNANT NEOPLASM OF CERVIX: ICD-10-CM

## 2021-03-26 PROCEDURE — 3074F SYST BP LT 130 MM HG: CPT | Performed by: OBSTETRICS & GYNECOLOGY

## 2021-03-26 PROCEDURE — 99396 PREV VISIT EST AGE 40-64: CPT | Performed by: OBSTETRICS & GYNECOLOGY

## 2021-03-26 PROCEDURE — 3079F DIAST BP 80-89 MM HG: CPT | Performed by: OBSTETRICS & GYNECOLOGY

## 2021-03-26 RX ORDER — FERROUS SULFATE 325(65) MG
325 TABLET ORAL
COMMUNITY
Start: 2018-05-18 | End: 2021-05-19

## 2021-03-26 NOTE — PROGRESS NOTES
Lacho Steward is a 38year old female [id number] Patient's last menstrual period was 03/25/2021. Patient presents with:  Gyn Exam: ANNUAL,MAMMO  . Her cycles are now regular but only last for 1-2 days.   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     Review of Systems:  Tamara motion  Uterus: normal in size, contour, position, mobility, without tenderness  Adnexa: normal without masses or tenderness, exam limited by body habitus  Perineum: normal  Anus: no hemorroids     Assessment & Plan:    Encounter for gynecological examinat

## 2021-03-29 LAB — HPV I/H RISK 1 DNA SPEC QL NAA+PROBE: NEGATIVE

## 2021-04-02 RX ORDER — DEXTROAMPHETAMINE SACCHARATE, AMPHETAMINE ASPARTATE, DEXTROAMPHETAMINE SULFATE AND AMPHETAMINE SULFATE 5; 5; 5; 5 MG/1; MG/1; MG/1; MG/1
TABLET ORAL
COMMUNITY
Start: 2018-04-01

## 2021-04-11 ENCOUNTER — PATIENT MESSAGE (OUTPATIENT)
Dept: FAMILY MEDICINE CLINIC | Facility: CLINIC | Age: 41
End: 2021-04-11

## 2021-04-12 ENCOUNTER — TELEPHONE (OUTPATIENT)
Dept: FAMILY MEDICINE CLINIC | Facility: CLINIC | Age: 41
End: 2021-04-12

## 2021-04-12 NOTE — TELEPHONE ENCOUNTER
From: Nas Davidson  To: Mckayla Knox MD  Sent: 4/11/2021 3:29 PM CDT  Subject: Non-Urgent Medical Question    Hi Dr. Cira Knox,    Since I've moved from [place], [place] had the worst time trying to find a new PCP.  No one seems genuinely interested in help

## 2021-04-12 NOTE — TELEPHONE ENCOUNTER
----- Message from Landry Davidson sent at 4/11/2021  3:29 PM CDT -----  Regarding: Non-Urgent Medical Question  Contact: [phone]  Hi Dr. Della Pemberton,    Since I've moved from [facility], [place] had the worst time trying to find a new PCP.   No one seems genuin

## 2021-04-14 NOTE — TELEPHONE ENCOUNTER
Previous message left by [facility] on 4/12/21 and left 2nd message to call back Triage RN re: [employer] message.    Pt read [email or web address] message as well on 4/12/21 to contact us re: her symptoms/medication request.

## 2021-05-16 DIAGNOSIS — I10 ESSENTIAL HYPERTENSION: ICD-10-CM

## 2021-05-17 ENCOUNTER — PATIENT MESSAGE (OUTPATIENT)
Dept: INTERNAL MEDICINE CLINIC | Facility: CLINIC | Age: 41
End: 2021-05-17

## 2021-05-18 DIAGNOSIS — I10 ESSENTIAL HYPERTENSION: ICD-10-CM

## 2021-05-18 RX ORDER — SPIRONOLACTONE 100 MG/1
100 TABLET, FILM COATED ORAL DAILY
Qty: 30 TABLET | Refills: 0 | Status: SHIPPED | OUTPATIENT
Start: 2021-05-18 | End: 2021-06-01

## 2021-05-18 RX ORDER — METOPROLOL SUCCINATE 25 MG/1
25 TABLET, EXTENDED RELEASE ORAL DAILY
Qty: 30 TABLET | Refills: 0 | Status: SHIPPED | OUTPATIENT
Start: 2021-05-18 | End: 2021-06-01

## 2021-05-18 RX ORDER — METOPROLOL SUCCINATE 25 MG/1
TABLET, EXTENDED RELEASE ORAL
Qty: 30 TABLET | Refills: 2 | OUTPATIENT
Start: 2021-05-18

## 2021-05-18 RX ORDER — AMLODIPINE BESYLATE 10 MG/1
10 TABLET ORAL DAILY
Qty: 30 TABLET | Refills: 0 | Status: SHIPPED | OUTPATIENT
Start: 2021-05-18 | End: 2021-06-01

## 2021-05-18 NOTE — TELEPHONE ENCOUNTER
From: Silas Dancer  To: Lizeth Matthew Stuart  Sent: 5/17/2021 10:30 AM CDT  Subject: Medication Question    Tawny Perdomo,    We received a refill request for Metoprolol. Per a last message from you, it was noted you had moved.  Please advise if you have and if you have a

## 2021-06-01 ENCOUNTER — OFFICE VISIT (OUTPATIENT)
Dept: INTERNAL MEDICINE CLINIC | Facility: CLINIC | Age: 41
End: 2021-06-01
Payer: COMMERCIAL

## 2021-06-01 VITALS
HEART RATE: 92 BPM | RESPIRATION RATE: 16 BRPM | WEIGHT: 244 LBS | BODY MASS INDEX: 39.21 KG/M2 | HEIGHT: 66 IN | DIASTOLIC BLOOD PRESSURE: 84 MMHG | TEMPERATURE: 97 F | SYSTOLIC BLOOD PRESSURE: 128 MMHG

## 2021-06-01 DIAGNOSIS — I10 ESSENTIAL HYPERTENSION: Primary | ICD-10-CM

## 2021-06-01 DIAGNOSIS — E74.39 GLUCOSE INTOLERANCE: ICD-10-CM

## 2021-06-01 PROCEDURE — 3074F SYST BP LT 130 MM HG: CPT | Performed by: INTERNAL MEDICINE

## 2021-06-01 PROCEDURE — 99213 OFFICE O/P EST LOW 20 MIN: CPT | Performed by: INTERNAL MEDICINE

## 2021-06-01 PROCEDURE — 3079F DIAST BP 80-89 MM HG: CPT | Performed by: INTERNAL MEDICINE

## 2021-06-01 PROCEDURE — 3008F BODY MASS INDEX DOCD: CPT | Performed by: INTERNAL MEDICINE

## 2021-06-01 RX ORDER — AMLODIPINE BESYLATE 10 MG/1
10 TABLET ORAL DAILY
Qty: 90 TABLET | Refills: 3 | Status: SHIPPED | OUTPATIENT
Start: 2021-06-01

## 2021-06-01 RX ORDER — SPIRONOLACTONE 100 MG/1
100 TABLET, FILM COATED ORAL DAILY
Qty: 90 TABLET | Refills: 3 | Status: SHIPPED | OUTPATIENT
Start: 2021-06-01

## 2021-06-01 RX ORDER — METOPROLOL SUCCINATE 25 MG/1
25 TABLET, EXTENDED RELEASE ORAL DAILY
Qty: 90 TABLET | Refills: 3 | Status: SHIPPED | OUTPATIENT
Start: 2021-06-01

## 2021-06-01 NOTE — PROGRESS NOTES
Cesilia Avila is a 38year old female. Patient presents with: Follow - Up:  Rm 3; discuss BP, medication review/refills      HPI:     Patient here for HTN follow up. Added metoprolol last OV and since then, BP has been controlled. 128/84 today.  She wo Adopted: Yes   Problem Relation Age of Onset   • Cancer Maternal Grandmother         throat cancer   • Depression Mother    • Alcohol and Other Disorders Associated Mother    • Bipolar Disorder Mother    • Hypertension Father    • Heart Disorder Father

## 2021-07-09 ENCOUNTER — TELEPHONE (OUTPATIENT)
Dept: INTERNAL MEDICINE CLINIC | Facility: CLINIC | Age: 41
End: 2021-07-09

## 2021-07-09 NOTE — TELEPHONE ENCOUNTER
Received our form completed and put all forms in red folder. No appointment scheduled for surgery as yet.

## 2021-07-09 NOTE — TELEPHONE ENCOUNTER
Future Appointments   Date Time Provider Lore Childs   7/26/2021  8:20 AM Adams Arevalo, APRN [facility]     Patient is having Bariatric Surgery with Dr. Patricia Navarrete.     Would not set a date until patient had pre-op scheduled

## 2021-07-21 ENCOUNTER — HOSPITAL ENCOUNTER (OUTPATIENT)
Dept: MAMMOGRAPHY | Age: 41
Discharge: HOME | End: 2021-07-21
Attending: OBSTETRICS & GYNECOLOGY
Payer: COMMERCIAL

## 2021-07-21 ENCOUNTER — LAB ENCOUNTER (OUTPATIENT)
Dept: LAB | Age: 41
End: 2021-07-21
Attending: INTERNAL MEDICINE
Payer: COMMERCIAL

## 2021-07-21 DIAGNOSIS — E66.01 MORBID OBESITY (HCC): Primary | ICD-10-CM

## 2021-07-21 DIAGNOSIS — Z12.31 VISIT FOR SCREENING MAMMOGRAM: ICD-10-CM

## 2021-07-21 LAB
ALBUMIN SERPL-MCNC: 4.1 G/DL (ref 3.4–5)
ALBUMIN/GLOB SERPL: 1 {RATIO} (ref 1–2)
ALP LIVER SERPL-CCNC: 78 U/L
ALT SERPL-CCNC: 28 U/L
ANION GAP SERPL CALC-SCNC: 5 MMOL/L (ref 0–18)
APTT PPP: 29.7 SECONDS (ref 25.4–36.1)
AST SERPL-CCNC: 17 U/L (ref 15–37)
BASOPHILS # BLD AUTO: 0.05 X10(3) UL (ref 0–0.2)
BASOPHILS NFR BLD AUTO: 0.8 %
BILIRUB SERPL-MCNC: 0.3 MG/DL (ref 0.1–2)
BILIRUB UR QL STRIP.AUTO: NEGATIVE
BUN BLD-MCNC: 10 MG/DL (ref 7–18)
BUN/CREAT SERPL: 11.5 (ref 10–20)
CALCIUM BLD-MCNC: 9.5 MG/DL (ref 8.5–10.1)
CHLORIDE SERPL-SCNC: 106 MMOL/L (ref 98–112)
CO2 SERPL-SCNC: 25 MMOL/L (ref 21–32)
COLOR UR AUTO: YELLOW
CREAT BLD-MCNC: 0.87 MG/DL
DEPRECATED RDW RBC AUTO: 41.8 FL (ref 35.1–46.3)
EOSINOPHIL # BLD AUTO: 0.07 X10(3) UL (ref 0–0.7)
EOSINOPHIL NFR BLD AUTO: 1.1 %
ERYTHROCYTE [DISTWIDTH] IN BLOOD BY AUTOMATED COUNT: 12.7 % (ref 11–15)
GLOBULIN PLAS-MCNC: 4.3 G/DL (ref 2.8–4.4)
GLUCOSE BLD-MCNC: 92 MG/DL (ref 70–99)
GLUCOSE UR STRIP.AUTO-MCNC: NEGATIVE MG/DL
HCT VFR BLD AUTO: 43.1 %
HGB BLD-MCNC: 13.5 G/DL
IMM GRANULOCYTES # BLD AUTO: 0.02 X10(3) UL (ref 0–1)
IMM GRANULOCYTES NFR BLD: 0.3 %
INR BLD: 0.94 (ref 0.89–1.11)
KETONES UR STRIP.AUTO-MCNC: NEGATIVE MG/DL
LEUKOCYTE ESTERASE UR QL STRIP.AUTO: NEGATIVE
LYMPHOCYTES # BLD AUTO: 1.99 X10(3) UL (ref 1–4)
LYMPHOCYTES NFR BLD AUTO: 30.5 %
M PROTEIN MFR SERPL ELPH: 8.4 G/DL (ref 6.4–8.2)
MCH RBC QN AUTO: 28.1 PG (ref 26–34)
MCHC RBC AUTO-ENTMCNC: 31.3 G/DL (ref 31–37)
MCV RBC AUTO: 89.6 FL
MONOCYTES # BLD AUTO: 0.62 X10(3) UL (ref 0.1–1)
MONOCYTES NFR BLD AUTO: 9.5 %
NEUTROPHILS # BLD AUTO: 3.78 X10 (3) UL (ref 1.5–7.7)
NEUTROPHILS # BLD AUTO: 3.78 X10(3) UL (ref 1.5–7.7)
NEUTROPHILS NFR BLD AUTO: 57.8 %
NITRITE UR QL STRIP.AUTO: NEGATIVE
OSMOLALITY SERPL CALC.SUM OF ELEC: 281 MOSM/KG (ref 275–295)
PH UR STRIP.AUTO: 6 [PH] (ref 5–8)
PLATELET # BLD AUTO: 312 10(3)UL (ref 150–450)
POTASSIUM SERPL-SCNC: 4.3 MMOL/L (ref 3.5–5.1)
PROT UR STRIP.AUTO-MCNC: NEGATIVE MG/DL
PSA SERPL DL<=0.01 NG/ML-MCNC: 12.9 SECONDS (ref 12.4–14.6)
RBC # BLD AUTO: 4.81 X10(6)UL
SODIUM SERPL-SCNC: 136 MMOL/L (ref 136–145)
SP GR UR STRIP.AUTO: 1.02 (ref 1–1.03)
TSI SER-ACNC: 2.2 MIU/ML (ref 0.36–3.74)
UROBILINOGEN UR STRIP.AUTO-MCNC: <2 MG/DL
WBC # BLD AUTO: 6.5 X10(3) UL (ref 4–11)

## 2021-07-21 PROCEDURE — 80053 COMPREHEN METABOLIC PANEL: CPT

## 2021-07-21 PROCEDURE — 85730 THROMBOPLASTIN TIME PARTIAL: CPT

## 2021-07-21 PROCEDURE — 84443 ASSAY THYROID STIM HORMONE: CPT

## 2021-07-21 PROCEDURE — 36415 COLL VENOUS BLD VENIPUNCTURE: CPT

## 2021-07-21 PROCEDURE — 81001 URINALYSIS AUTO W/SCOPE: CPT

## 2021-07-21 PROCEDURE — 77063 BREAST TOMOSYNTHESIS BI: CPT | Performed by: OBSTETRICS & GYNECOLOGY

## 2021-07-21 PROCEDURE — 77067 SCR MAMMO BI INCL CAD: CPT | Performed by: OBSTETRICS & GYNECOLOGY

## 2021-07-21 PROCEDURE — 85610 PROTHROMBIN TIME: CPT

## 2021-07-21 PROCEDURE — 85025 COMPLETE CBC W/AUTO DIFF WBC: CPT

## 2021-07-23 NOTE — TELEPHONE ENCOUNTER
Called Dr. Suresh Michelle office and advised that pt is scheduled for pre-op on 7/26, spoke with Dr. Jonatan Yates himself and he stated pt is not scheduled but will be seen within 30days of the clearance.

## 2021-07-26 ENCOUNTER — OFFICE VISIT (OUTPATIENT)
Dept: INTERNAL MEDICINE CLINIC | Facility: CLINIC | Age: 41
End: 2021-07-26
Payer: COMMERCIAL

## 2021-07-26 VITALS
WEIGHT: 241 LBS | DIASTOLIC BLOOD PRESSURE: 82 MMHG | HEIGHT: 66 IN | TEMPERATURE: 98 F | SYSTOLIC BLOOD PRESSURE: 130 MMHG | HEART RATE: 90 BPM | BODY MASS INDEX: 38.73 KG/M2

## 2021-07-26 DIAGNOSIS — F41.1 GAD (GENERALIZED ANXIETY DISORDER): ICD-10-CM

## 2021-07-26 DIAGNOSIS — Z01.818 PRE-OP EVALUATION: Primary | ICD-10-CM

## 2021-07-26 DIAGNOSIS — R06.00 DOE (DYSPNEA ON EXERTION): ICD-10-CM

## 2021-07-26 DIAGNOSIS — H60.502 ACUTE OTITIS EXTERNA OF LEFT EAR, UNSPECIFIED TYPE: ICD-10-CM

## 2021-07-26 DIAGNOSIS — E66.01 MORBID OBESITY (HCC): ICD-10-CM

## 2021-07-26 DIAGNOSIS — I10 ESSENTIAL HYPERTENSION: ICD-10-CM

## 2021-07-26 DIAGNOSIS — Z86.59 HISTORY OF DEPRESSION: ICD-10-CM

## 2021-07-26 PROBLEM — D62 ANEMIA DUE TO ACUTE BLOOD LOSS: Status: RESOLVED | Noted: 2018-05-22 | Resolved: 2021-07-26

## 2021-07-26 PROBLEM — R06.09 DOE (DYSPNEA ON EXERTION): Status: RESOLVED | Noted: 2021-02-17 | Resolved: 2021-07-26

## 2021-07-26 PROBLEM — T81.49XA POSTOPERATIVE WOUND INFECTION: Status: RESOLVED | Noted: 2018-05-22 | Resolved: 2021-07-26

## 2021-07-26 PROBLEM — F41.8 SITUATIONAL ANXIETY: Status: ACTIVE | Noted: 2021-01-16

## 2021-07-26 LAB
BILIRUB UR QL STRIP.AUTO: NEGATIVE
COLOR UR AUTO: YELLOW
GLUCOSE UR STRIP.AUTO-MCNC: NEGATIVE MG/DL
KETONES UR STRIP.AUTO-MCNC: NEGATIVE MG/DL
LEUKOCYTE ESTERASE UR QL STRIP.AUTO: NEGATIVE
NITRITE UR QL STRIP.AUTO: NEGATIVE
PH UR STRIP.AUTO: 6 [PH] (ref 5–8)
PROT UR STRIP.AUTO-MCNC: NEGATIVE MG/DL
RBC UR QL AUTO: NEGATIVE
SP GR UR STRIP.AUTO: 1.02 (ref 1–1.03)
UROBILINOGEN UR STRIP.AUTO-MCNC: <2 MG/DL

## 2021-07-26 PROCEDURE — 81001 URINALYSIS AUTO W/SCOPE: CPT | Performed by: NURSE PRACTITIONER

## 2021-07-26 PROCEDURE — 3008F BODY MASS INDEX DOCD: CPT | Performed by: NURSE PRACTITIONER

## 2021-07-26 PROCEDURE — 93000 ELECTROCARDIOGRAM COMPLETE: CPT | Performed by: NURSE PRACTITIONER

## 2021-07-26 PROCEDURE — 3075F SYST BP GE 130 - 139MM HG: CPT | Performed by: NURSE PRACTITIONER

## 2021-07-26 PROCEDURE — 99244 OFF/OP CNSLTJ NEW/EST MOD 40: CPT | Performed by: NURSE PRACTITIONER

## 2021-07-26 PROCEDURE — 3079F DIAST BP 80-89 MM HG: CPT | Performed by: NURSE PRACTITIONER

## 2021-07-26 RX ORDER — NEOMYCIN SULFATE, POLYMYXIN B SULFATE AND HYDROCORTISONE 10; 3.5; 1 MG/ML; MG/ML; [USP'U]/ML
4 SUSPENSION/ DROPS AURICULAR (OTIC) 3 TIMES DAILY
Qty: 10 ML | Refills: 0 | Status: SHIPPED | OUTPATIENT
Start: 2021-07-26

## 2021-07-26 NOTE — TELEPHONE ENCOUNTER
LM for Pt to call back, not clear why referral is needed ADD ADDITIONAL PRIOR PREGNANCY INFORMATION...

## 2021-07-26 NOTE — PROGRESS NOTES
[facility]  PRE OP RISK ASSESSMENT    REASON FOR CONSULT: Pre-op risk assessment for surgical procedure sleeve gastrectomy planned for unknown date with Dr Janice Hutton .     REQUESTING PHYSICIAN: Dr Margareth Villalobos: Patient presents with: Metoprolol Succinate ER 25 MG Oral Tablet 24 Hr Take 1 tablet (25 mg total) by mouth daily. 90 tablet 3   • spironolactone 100 MG Oral Tab Take 1 tablet (100 mg total) by mouth daily.  90 tablet 3   • amLODIPine Besylate 10 MG Oral Tab Take 1 tablet (10 mg 241 lb (109.3 kg)   LMP 07/20/2021   BMI 38.90 kg/m²    GENERAL: well developed, well nourished,in no apparent distress  SKIN: no rashes,no suspicious lesions  HEENT: atraumatic, normocephalic,throat clear.   Right ear unremarkable  Left ear with erythemato Meds & Refills for this Visit:  Requested Prescriptions     Signed Prescriptions Disp Refills   • Neomycin-Polymyxin-HC 3.5-67907-9 Otic Suspension 10 mL 0     Sig: Place 4 drops into the left ear 3 (three) times daily.        Imaging & Consults:  ELEC

## 2022-03-02 ENCOUNTER — OFFICE VISIT (OUTPATIENT)
Dept: INTERNAL MEDICINE CLINIC | Facility: CLINIC | Age: 42
End: 2022-03-02
Payer: COMMERCIAL

## 2022-03-02 ENCOUNTER — LAB ENCOUNTER (OUTPATIENT)
Dept: LAB | Age: 42
End: 2022-03-02
Attending: INTERNAL MEDICINE
Payer: COMMERCIAL

## 2022-03-02 VITALS
RESPIRATION RATE: 18 BRPM | DIASTOLIC BLOOD PRESSURE: 68 MMHG | HEIGHT: 66 IN | WEIGHT: 217 LBS | SYSTOLIC BLOOD PRESSURE: 124 MMHG | OXYGEN SATURATION: 98 % | BODY MASS INDEX: 34.87 KG/M2 | TEMPERATURE: 98 F | HEART RATE: 85 BPM

## 2022-03-02 DIAGNOSIS — L02.91 ABSCESS: Primary | ICD-10-CM

## 2022-03-02 DIAGNOSIS — Z11.3 SCREEN FOR STD (SEXUALLY TRANSMITTED DISEASE): ICD-10-CM

## 2022-03-02 DIAGNOSIS — L02.91 ABSCESS: ICD-10-CM

## 2022-03-02 DIAGNOSIS — L70.0 ACNE VULGARIS: ICD-10-CM

## 2022-03-02 LAB
ALBUMIN SERPL-MCNC: 3.9 G/DL (ref 3.4–5)
ALBUMIN/GLOB SERPL: 1 {RATIO} (ref 1–2)
ALP LIVER SERPL-CCNC: 76 U/L
ALT SERPL-CCNC: 21 U/L
ANION GAP SERPL CALC-SCNC: 6 MMOL/L (ref 0–18)
AST SERPL-CCNC: 15 U/L (ref 15–37)
BASOPHILS # BLD AUTO: 0.04 X10(3) UL (ref 0–0.2)
BASOPHILS NFR BLD AUTO: 0.6 %
BILIRUB SERPL-MCNC: 0.3 MG/DL (ref 0.1–2)
BUN BLD-MCNC: 11 MG/DL (ref 7–18)
CALCIUM BLD-MCNC: 10.1 MG/DL (ref 8.5–10.1)
CHLORIDE SERPL-SCNC: 103 MMOL/L (ref 98–112)
CO2 SERPL-SCNC: 27 MMOL/L (ref 21–32)
CREAT BLD-MCNC: 0.81 MG/DL
EOSINOPHIL # BLD AUTO: 0.09 X10(3) UL (ref 0–0.7)
EOSINOPHIL NFR BLD AUTO: 1.3 %
ERYTHROCYTE [DISTWIDTH] IN BLOOD BY AUTOMATED COUNT: 12.9 %
FASTING STATUS PATIENT QL REPORTED: NO
GLOBULIN PLAS-MCNC: 4.1 G/DL (ref 2.8–4.4)
GLUCOSE BLD-MCNC: 95 MG/DL (ref 70–99)
HBV SURFACE AB SER QL: NONREACTIVE
HBV SURFACE AB SERPL IA-ACNC: <3.1 MIU/ML
HBV SURFACE AG SER-ACNC: <0.1 [IU]/L
HBV SURFACE AG SERPL QL IA: NONREACTIVE
HCT VFR BLD AUTO: 40.2 %
HCV AB SERPL QL IA: NONREACTIVE
HGB BLD-MCNC: 12.5 G/DL
IMM GRANULOCYTES # BLD AUTO: 0.02 X10(3) UL (ref 0–1)
IMM GRANULOCYTES NFR BLD: 0.3 %
LYMPHOCYTES # BLD AUTO: 1.7 X10(3) UL (ref 1–4)
LYMPHOCYTES NFR BLD AUTO: 23.9 %
MCH RBC QN AUTO: 28.2 PG (ref 26–34)
MCHC RBC AUTO-ENTMCNC: 31.1 G/DL (ref 31–37)
MCV RBC AUTO: 90.7 FL
MONOCYTES # BLD AUTO: 0.62 X10(3) UL (ref 0.1–1)
MONOCYTES NFR BLD AUTO: 8.7 %
NEUTROPHILS # BLD AUTO: 4.65 X10 (3) UL (ref 1.5–7.7)
NEUTROPHILS # BLD AUTO: 4.65 X10(3) UL (ref 1.5–7.7)
NEUTROPHILS NFR BLD AUTO: 65.2 %
OSMOLALITY SERPL CALC.SUM OF ELEC: 281 MOSM/KG (ref 275–295)
PLATELET # BLD AUTO: 356 10(3)UL (ref 150–450)
POTASSIUM SERPL-SCNC: 4.8 MMOL/L (ref 3.5–5.1)
PROT SERPL-MCNC: 8 G/DL (ref 6.4–8.2)
RBC # BLD AUTO: 4.43 X10(6)UL
SODIUM SERPL-SCNC: 136 MMOL/L (ref 136–145)
T PALLIDUM AB SER QL IA: NONREACTIVE
WBC # BLD AUTO: 7.1 X10(3) UL (ref 4–11)

## 2022-03-02 PROCEDURE — 3008F BODY MASS INDEX DOCD: CPT | Performed by: INTERNAL MEDICINE

## 2022-03-02 PROCEDURE — 86803 HEPATITIS C AB TEST: CPT | Performed by: INTERNAL MEDICINE

## 2022-03-02 PROCEDURE — 86706 HEP B SURFACE ANTIBODY: CPT | Performed by: INTERNAL MEDICINE

## 2022-03-02 PROCEDURE — 3078F DIAST BP <80 MM HG: CPT | Performed by: INTERNAL MEDICINE

## 2022-03-02 PROCEDURE — 87389 HIV-1 AG W/HIV-1&-2 AB AG IA: CPT | Performed by: INTERNAL MEDICINE

## 2022-03-02 PROCEDURE — 87340 HEPATITIS B SURFACE AG IA: CPT | Performed by: INTERNAL MEDICINE

## 2022-03-02 PROCEDURE — 85025 COMPLETE CBC W/AUTO DIFF WBC: CPT | Performed by: INTERNAL MEDICINE

## 2022-03-02 PROCEDURE — 99213 OFFICE O/P EST LOW 20 MIN: CPT | Performed by: INTERNAL MEDICINE

## 2022-03-02 PROCEDURE — 86780 TREPONEMA PALLIDUM: CPT | Performed by: INTERNAL MEDICINE

## 2022-03-02 PROCEDURE — 3074F SYST BP LT 130 MM HG: CPT | Performed by: INTERNAL MEDICINE

## 2022-03-02 PROCEDURE — 80053 COMPREHEN METABOLIC PANEL: CPT | Performed by: INTERNAL MEDICINE

## 2022-03-02 RX ORDER — CLINDAMYCIN HYDROCHLORIDE 300 MG/1
300 CAPSULE ORAL 3 TIMES DAILY
Qty: 21 CAPSULE | Refills: 0 | Status: SHIPPED | OUTPATIENT
Start: 2022-03-02

## 2022-03-03 ENCOUNTER — TELEPHONE (OUTPATIENT)
Dept: SURGERY | Facility: CLINIC | Age: 42
End: 2022-03-03

## 2022-03-03 ENCOUNTER — OFFICE VISIT (OUTPATIENT)
Dept: SURGERY | Facility: CLINIC | Age: 42
End: 2022-03-03
Payer: COMMERCIAL

## 2022-03-03 ENCOUNTER — LAB ENCOUNTER (OUTPATIENT)
Dept: LAB | Facility: HOSPITAL | Age: 42
End: 2022-03-03
Attending: SURGERY
Payer: COMMERCIAL

## 2022-03-03 VITALS — HEART RATE: 80 BPM | DIASTOLIC BLOOD PRESSURE: 83 MMHG | TEMPERATURE: 97 F | SYSTOLIC BLOOD PRESSURE: 127 MMHG

## 2022-03-03 DIAGNOSIS — L02.91 ABSCESS: Primary | ICD-10-CM

## 2022-03-03 DIAGNOSIS — L02.91 ABSCESS: ICD-10-CM

## 2022-03-03 LAB — SARS-COV-2 RNA RESP QL NAA+PROBE: NOT DETECTED

## 2022-03-03 PROCEDURE — 3079F DIAST BP 80-89 MM HG: CPT | Performed by: SURGERY

## 2022-03-03 PROCEDURE — 3074F SYST BP LT 130 MM HG: CPT | Performed by: SURGERY

## 2022-03-03 PROCEDURE — 99243 OFF/OP CNSLTJ NEW/EST LOW 30: CPT | Performed by: SURGERY

## 2022-03-04 ENCOUNTER — ANESTHESIA EVENT (OUTPATIENT)
Dept: SURGERY | Facility: HOSPITAL | Age: 42
End: 2022-03-04
Payer: COMMERCIAL

## 2022-03-04 ENCOUNTER — HOSPITAL ENCOUNTER (OUTPATIENT)
Facility: HOSPITAL | Age: 42
Setting detail: HOSPITAL OUTPATIENT SURGERY
Discharge: HOME | End: 2022-03-04
Attending: SURGERY | Admitting: SURGERY
Payer: COMMERCIAL

## 2022-03-04 ENCOUNTER — ANESTHESIA (OUTPATIENT)
Dept: SURGERY | Facility: HOSPITAL | Age: 42
End: 2022-03-04
Payer: COMMERCIAL

## 2022-03-04 VITALS
BODY MASS INDEX: 34.81 KG/M2 | HEART RATE: 63 BPM | TEMPERATURE: 97 F | OXYGEN SATURATION: 100 % | WEIGHT: 216.63 LBS | HEIGHT: 66 IN | RESPIRATION RATE: 18 BRPM | SYSTOLIC BLOOD PRESSURE: 130 MMHG | DIASTOLIC BLOOD PRESSURE: 80 MMHG

## 2022-03-04 DIAGNOSIS — L02.91 ABSCESS: Primary | ICD-10-CM

## 2022-03-04 LAB — B-HCG UR QL: NEGATIVE

## 2022-03-04 PROCEDURE — 10061 I&D ABSCESS COMP/MULTIPLE: CPT | Performed by: SURGERY

## 2022-03-04 PROCEDURE — 0J9P0ZZ DRAINAGE OF LEFT LOWER LEG SUBCUTANEOUS TISSUE AND FASCIA, OPEN APPROACH: ICD-10-PCS | Performed by: SURGERY

## 2022-03-04 RX ORDER — SODIUM CHLORIDE, SODIUM LACTATE, POTASSIUM CHLORIDE, CALCIUM CHLORIDE 600; 310; 30; 20 MG/100ML; MG/100ML; MG/100ML; MG/100ML
INJECTION, SOLUTION INTRAVENOUS CONTINUOUS
Status: DISCONTINUED | OUTPATIENT
Start: 2022-03-04 | End: 2022-03-05

## 2022-03-04 RX ORDER — DOCUSATE SODIUM 100 MG/1
100 CAPSULE, LIQUID FILLED ORAL 3 TIMES DAILY
Qty: 90 CAPSULE | Refills: 0 | Status: SHIPPED | OUTPATIENT
Start: 2022-03-04 | End: 2022-03-18 | Stop reason: ALTCHOICE

## 2022-03-04 RX ORDER — ONDANSETRON 2 MG/ML
4 INJECTION INTRAMUSCULAR; INTRAVENOUS AS NEEDED
Status: DISCONTINUED | OUTPATIENT
Start: 2022-03-04 | End: 2022-03-05

## 2022-03-04 RX ORDER — MIDAZOLAM HYDROCHLORIDE 1 MG/ML
INJECTION INTRAMUSCULAR; INTRAVENOUS AS NEEDED
Status: DISCONTINUED | OUTPATIENT
Start: 2022-03-04 | End: 2022-03-04 | Stop reason: SURG

## 2022-03-04 RX ORDER — CLINDAMYCIN PHOSPHATE 900 MG/50ML
INJECTION INTRAVENOUS
Status: DISCONTINUED
Start: 2022-03-04 | End: 2022-03-05

## 2022-03-04 RX ORDER — ACETAMINOPHEN 500 MG
1000 TABLET ORAL ONCE
Status: DISCONTINUED | OUTPATIENT
Start: 2022-03-04 | End: 2022-03-04 | Stop reason: HOSPADM

## 2022-03-04 RX ORDER — LABETALOL HYDROCHLORIDE 5 MG/ML
5 INJECTION, SOLUTION INTRAVENOUS EVERY 5 MIN PRN
Status: DISCONTINUED | OUTPATIENT
Start: 2022-03-04 | End: 2022-03-05

## 2022-03-04 RX ORDER — METOCLOPRAMIDE HYDROCHLORIDE 5 MG/ML
10 INJECTION INTRAMUSCULAR; INTRAVENOUS AS NEEDED
Status: DISCONTINUED | OUTPATIENT
Start: 2022-03-04 | End: 2022-03-05

## 2022-03-04 RX ORDER — HEPARIN SODIUM 5000 [USP'U]/ML
INJECTION, SOLUTION INTRAVENOUS; SUBCUTANEOUS
Status: DISCONTINUED
Start: 2022-03-04 | End: 2022-03-05

## 2022-03-04 RX ORDER — HYDROCODONE BITARTRATE AND ACETAMINOPHEN 5; 325 MG/1; MG/1
1 TABLET ORAL AS NEEDED
Status: COMPLETED | OUTPATIENT
Start: 2022-03-04 | End: 2022-03-04

## 2022-03-04 RX ORDER — NALOXONE HYDROCHLORIDE 0.4 MG/ML
80 INJECTION, SOLUTION INTRAMUSCULAR; INTRAVENOUS; SUBCUTANEOUS AS NEEDED
Status: DISCONTINUED | OUTPATIENT
Start: 2022-03-04 | End: 2022-03-05

## 2022-03-04 RX ORDER — HYDROCODONE BITARTRATE AND ACETAMINOPHEN 5; 325 MG/1; MG/1
2 TABLET ORAL AS NEEDED
Status: COMPLETED | OUTPATIENT
Start: 2022-03-04 | End: 2022-03-04

## 2022-03-04 RX ORDER — HYDROMORPHONE HYDROCHLORIDE 1 MG/ML
0.4 INJECTION, SOLUTION INTRAMUSCULAR; INTRAVENOUS; SUBCUTANEOUS EVERY 5 MIN PRN
Status: DISCONTINUED | OUTPATIENT
Start: 2022-03-04 | End: 2022-03-05

## 2022-03-04 RX ORDER — LIDOCAINE HYDROCHLORIDE AND EPINEPHRINE 10; 10 MG/ML; UG/ML
INJECTION, SOLUTION INFILTRATION; PERINEURAL AS NEEDED
Status: DISCONTINUED | OUTPATIENT
Start: 2022-03-04 | End: 2022-03-04 | Stop reason: HOSPADM

## 2022-03-04 RX ORDER — HEPARIN SODIUM 5000 [USP'U]/ML
5000 INJECTION, SOLUTION INTRAVENOUS; SUBCUTANEOUS ONCE
Status: COMPLETED | OUTPATIENT
Start: 2022-03-04 | End: 2022-03-04

## 2022-03-04 RX ORDER — CLINDAMYCIN PHOSPHATE 900 MG/50ML
900 INJECTION INTRAVENOUS ONCE
Status: COMPLETED | OUTPATIENT
Start: 2022-03-04 | End: 2022-03-04

## 2022-03-04 RX ORDER — HYDROCODONE BITARTRATE AND ACETAMINOPHEN 5; 325 MG/1; MG/1
TABLET ORAL
Qty: 10 TABLET | Refills: 0 | Status: SHIPPED | OUTPATIENT
Start: 2022-03-04

## 2022-03-04 RX ORDER — LIDOCAINE HYDROCHLORIDE 10 MG/ML
INJECTION, SOLUTION EPIDURAL; INFILTRATION; INTRACAUDAL; PERINEURAL AS NEEDED
Status: DISCONTINUED | OUTPATIENT
Start: 2022-03-04 | End: 2022-03-04 | Stop reason: SURG

## 2022-03-04 RX ADMIN — CLINDAMYCIN PHOSPHATE 900 MG: 900 INJECTION INTRAVENOUS at 20:41:00

## 2022-03-04 RX ADMIN — MIDAZOLAM HYDROCHLORIDE 2 MG: 1 INJECTION INTRAMUSCULAR; INTRAVENOUS at 20:33:00

## 2022-03-04 RX ADMIN — SODIUM CHLORIDE, SODIUM LACTATE, POTASSIUM CHLORIDE, CALCIUM CHLORIDE: 600; 310; 30; 20 INJECTION, SOLUTION INTRAVENOUS at 20:33:00

## 2022-03-04 RX ADMIN — LIDOCAINE HYDROCHLORIDE 30 MG: 10 INJECTION, SOLUTION EPIDURAL; INFILTRATION; INTRACAUDAL; PERINEURAL at 20:38:00

## 2022-03-04 RX ADMIN — SODIUM CHLORIDE, SODIUM LACTATE, POTASSIUM CHLORIDE, CALCIUM CHLORIDE: 600; 310; 30; 20 INJECTION, SOLUTION INTRAVENOUS at 21:10:00

## 2022-03-05 NOTE — ANESTHESIA POSTPROCEDURE EVALUATION
[address] Patient Status:  Hospital Outpatient Surgery   Age/Gender 42year old female MRN [id number]   [facility] SURGERY Attending Jairon Awan MD   Hosp Day # 0 PCP Andrade Avalos MD       Anesthesia Post-op Note    INCISION AND DRAINAGE OF LEFT INNER THIGH ABSCESS    Procedure Summary     Date: 03/04/22 Room / Location: [facility] OR 12 / 1404 [facility] OR    Anesthesia Start: 2033 Anesthesia Stop: 2110    Procedure: INCISION AND DRAINAGE OF LEFT INNER THIGH ABSCESS (Left Thigh) Diagnosis:       Abscess      (Abscess [L02.91])    Surgeons: Penelope Mena MD Anesthesiologist: John Kyle MD    Anesthesia Type: MAC ASA Status: 2          Anesthesia Type: MAC    Vitals Value Taken Time   /79 03/04/22 2111   Temp 96.9 03/04/22 2111   Pulse 76 03/04/22 2111   Resp 16 03/04/22 2111   SpO2 100 03/04/22 2111       Patient Location: Same Day Surgery    Anesthesia Type: MAC    Airway Patency: patent    Postop Pain Control: adequate    Mental Status: mildly sedated but able to meaningfully participate in the post-anesthesia evaluation    Nausea/Vomiting: none    Cardiopulmonary/Hydration status: stable euvolemic    Complications: no apparent anesthesia related complications    Postop vital signs: stable    Dental Exam: Unchanged from Postop

## 2022-03-05 NOTE — OPERATIVE REPORT
[facility]    PATIENT'S NAME: Alondra Hunt   ATTENDING PHYSICIAN: Ray Mcfarlane M.D. OPERATING PHYSICIAN: Ray Mcfarlane M.D. PATIENT ACCOUNT#:   [de-identified]    LOCATION:  [facility] 2 EDWP 10  MEDICAL RECORD #:   [id number]       YOB: 1980  ADMISSION DATE:       03/04/2022      OPERATION DATE:  03/04/2022    OPERATIVE REPORT      PREOPERATIVE DIAGNOSIS:  Left inner thigh abscess. POSTOPERATIVE DIAGNOSIS:  Left inner thigh abscess. PROCEDURE:  Incision and drainage of left inner thigh abscess. ASSISTANT:  Ania Baker PA-C     ANESTHESIA:  Monitored anesthesia care. ANESTHESIOLOGIST:  Rai Trent MD    BLOOD LOSS:  Less than 2 mL. COMPLICATIONS:  None apparent. SPECIMENS:  None. DISPOSITION:  The patient was awakened from anesthesia and brought to the recovery room in stable condition. She tolerated the procedure without apparent complication. Needle, sponge, and instrument counts were correct at the end the procedure. Please note, preprocedure antibiotic and DVT prophylaxis were administered per protocol and time-out was performed prior to initiating the procedure identifying the correct patient, procedure, surgeon, and site of surgery. The patient indicated the site of surgery in the preoperative holding area, which I marked and with which the patient concurred. INDICATIONS:  Please review the preprocedure history and physical.  Briefly, the patient is a 69-year-old female who presents with an abscess of the proximal inner thigh that has undergone suboptimal drainage and now presents for surgical intervention. Risks, benefits, and alternatives of surgery were explained to the patient in detail. Please see the preprocedure history and physical for further details. FINDINGS:  There is a loculated abscess in the proximal left inner thigh. OPERATIVE TECHNIQUE:  The patient was brought to the operating room.   After induction of anesthesia and after adequate sedation was achieved, she was placed in the frog-leg position while supine on the operating table and the left thigh was prepped and draped in the usual sterile fashion. The prior I and D opening was identified and probed. The extent of the abscess cavity was delineated and marked. Next, local anesthetic was generously injected, after which an elliptical incision was created using a scalpel to incise the skin in full thickness. Hemostasis was then achieved with the electrocautery after the ellipse of skin had been removed and discarded. Immediately identified was the subcutaneous abscess cavity that was immediately drained, irrigated, and cleansed. Hemostasis was then achieved with electrocautery. The wound was then packed with 4 x 4 gauze and covered with a clean dry dressing. The patient was then awakened from anesthesia and brought to the recovery room in stable condition, having tolerated the procedure without apparent complication. Needle, sponge, and instrument counts were correct at the end the procedure. Operative findings were then discussed with the patient's family immediately upon conclusion of surgery.     Dictated By Lj Padilla M.D.  d: 03/04/2022 23:11:61  t: 03/05/2022 07:20:12  Job [id number]  /

## 2022-03-05 NOTE — INTERVAL H&P NOTE
Pre-op Diagnosis: Abscess [L02.91]    The above referenced H&P was reviewed by Debra Wallace MD on 3/4/2022, the patient was examined and no significant changes have occurred in the patient's condition since the H&P was performed. I discussed with the patient and/or legal representative the potential benefits, risks and side effects of this procedure; the likelihood of the patient achieving goals; and potential problems that might occur during recuperation. I discussed reasonable alternatives to the procedure, including risks, benefits and side effects related to the alternatives and risks related to not receiving this procedure. We will proceed with procedure as planned.

## 2022-03-05 NOTE — BRIEF OP NOTE
Pre-Operative Diagnosis: Abscess [L02.91]     Post-Operative Diagnosis: Abscess [L02.91]      Procedure Performed:   INCISION AND DRAINAGE OF LEFT INNER THIGH ABSCESS    Surgeon(s) and Role:     * Norma Stewart MD - Primary    Assistant(s):  PA: Mic Alejandro PA-C     Surgical Findings: Left thigh abscess     Specimen: none     Estimated Blood Loss: Blood Output: 2 mL (3/4/2022  8:59 PM)      Dictation Number:  [id number]    Kayla Gillespie MD  3/4/2022  9:01 PM

## 2022-03-18 ENCOUNTER — OFFICE VISIT (OUTPATIENT)
Dept: INTERNAL MEDICINE CLINIC | Facility: CLINIC | Age: 42
End: 2022-03-18
Payer: COMMERCIAL

## 2022-03-18 VITALS
BODY MASS INDEX: 37.05 KG/M2 | RESPIRATION RATE: 16 BRPM | HEART RATE: 68 BPM | TEMPERATURE: 97 F | HEIGHT: 64.37 IN | SYSTOLIC BLOOD PRESSURE: 124 MMHG | OXYGEN SATURATION: 99 % | WEIGHT: 217 LBS | DIASTOLIC BLOOD PRESSURE: 82 MMHG

## 2022-03-18 DIAGNOSIS — L27.0 DRUG RASH: Primary | ICD-10-CM

## 2022-03-18 PROCEDURE — 3079F DIAST BP 80-89 MM HG: CPT | Performed by: INTERNAL MEDICINE

## 2022-03-18 PROCEDURE — 99213 OFFICE O/P EST LOW 20 MIN: CPT | Performed by: INTERNAL MEDICINE

## 2022-03-18 PROCEDURE — 3008F BODY MASS INDEX DOCD: CPT | Performed by: INTERNAL MEDICINE

## 2022-03-18 PROCEDURE — 3074F SYST BP LT 130 MM HG: CPT | Performed by: INTERNAL MEDICINE

## 2022-03-18 RX ORDER — METHYLPREDNISOLONE 4 MG/1
TABLET ORAL
Qty: 1 EACH | Refills: 0 | Status: SHIPPED | OUTPATIENT
Start: 2022-03-18 | End: 2022-03-25

## 2022-03-18 RX ORDER — AZELAIC ACID 0.15 G/G
GEL TOPICAL
COMMUNITY
Start: 2022-03-07

## 2022-03-25 ENCOUNTER — PATIENT MESSAGE (OUTPATIENT)
Dept: INTERNAL MEDICINE CLINIC | Facility: CLINIC | Age: 42
End: 2022-03-25

## 2022-03-25 ENCOUNTER — TELEPHONE (OUTPATIENT)
Dept: INTERNAL MEDICINE CLINIC | Facility: CLINIC | Age: 42
End: 2022-03-25

## 2022-03-25 RX ORDER — METHYLPREDNISOLONE 4 MG/1
TABLET ORAL
Qty: 1 EACH | Refills: 0 | Status: SHIPPED | OUTPATIENT
Start: 2022-03-25 | End: 2022-03-26

## 2022-03-25 NOTE — TELEPHONE ENCOUNTER
From: Cornelia Davidson  To: Britt Saha MD  Sent: 3/25/2022 9:52 AM CDT  Subject: Laura Gomes. You treated me for a hive outbreak a few weeks ago. I am out of town & the hives came back worse! I finished my last dose of steroids on Wed. Can you please send a refill today to the new pharmacy I entered in [facility]? I might need something stronger. They are relentless.

## 2022-03-25 NOTE — TELEPHONE ENCOUNTER
Pt sent the below message through [employer] and called. Pt stated her hives are back, just as she is finished her medication for them. Pt is out of town and requesting a refill for out of state [facility] - see refill request from pt.  TE    Hi Dr Gunner Seay. You treated me for a hive outbreak a few weeks ago. I am out of town & the hives came back worse! I finished my last dose of steroids on Wed. Can you please send a refill today to the new pharmacy I entered in [facility]? I might need something stronger. They are relentless.    Attachments    [id number]

## 2022-03-25 NOTE — TELEPHONE ENCOUNTER
Please see if it is just the hives or also has other symptoms like wheezing, SOB, tongue or lip swelling. We can send refill in of medrol if only hives. If other symptoms, best to go to urgent care.   Likely needs to see allergist when back in town, I will put in referral for Dr. Zo Swartz

## 2022-03-25 NOTE — TELEPHONE ENCOUNTER
Last VISIT - 3/18/22 Rash    Last CPE - No recent physical    Last REFILL -     methylPREDNISolone (MEDROL) 4 MG Oral Tablet Therapy Pack 1 each 0 3/18/2022     Last LABS - 3/2/22 cmp, cbc    No future appointments. Per PROTOCOL? None    Please Approve or Deny. Vipul Amador

## 2022-03-26 RX ORDER — METHYLPREDNISOLONE 4 MG/1
TABLET ORAL
Qty: 1 EACH | Refills: 0 | Status: SHIPPED | OUTPATIENT
Start: 2022-03-26

## 2022-03-26 NOTE — TELEPHONE ENCOUNTER
Resent script due to printed off instead of [facility] cause no pharmacy was filled. Sending to avoid delay in patient treatment.

## 2022-03-31 RX ORDER — METHYLPREDNISOLONE 4 MG/1
TABLET ORAL
Qty: 21 EACH | Refills: 0 | OUTPATIENT
Start: 2022-03-31

## 2022-03-31 RX ORDER — METHYLPREDNISOLONE 4 MG/1
TABLET ORAL
Qty: 1 EACH | Refills: 0 | OUTPATIENT
Start: 2022-03-31

## 2022-04-02 DIAGNOSIS — L27.0 DRUG RASH: ICD-10-CM

## 2022-04-04 ENCOUNTER — TELEPHONE (OUTPATIENT)
Dept: INTERNAL MEDICINE CLINIC | Facility: CLINIC | Age: 42
End: 2022-04-04

## 2022-04-04 RX ORDER — METHYLPREDNISOLONE 4 MG/1
TABLET ORAL
Qty: 1 EACH | Refills: 0 | OUTPATIENT
Start: 2022-04-04

## 2022-04-04 RX ORDER — METHYLPREDNISOLONE 4 MG/1
TABLET ORAL
Qty: 21 EACH | Refills: 0 | OUTPATIENT
Start: 2022-04-04

## 2022-04-04 NOTE — TELEPHONE ENCOUNTER
Ok to update clinical information as requested by patient? Patient Review of Clinical Information    Problems   The patient or proxy has not reviewed this information. Medications   The patient or proxy has not reviewed this information, and there are updates pending:   Requested Medication Removals Start Date Reported By  Comments   HYDROcodone-acetaminophen 5-325 MG Tabs 3/4/2022 Akila Davidson    CALCIUM OR  Akila Davidson    Ergocalciferol (VITAMIN D OR)  Akila Davidson    MAGNESIUM OR  Clemente Davidson    clindamycin 300 MG Caps 3/2/2022 Akila Davidson      Allergies   The patient or proxy has not reviewed this information.

## 2022-04-04 NOTE — TELEPHONE ENCOUNTER
Last VISIT - 3/18/22 rash and hives    Last CPE - No recent physical    Last REFILL -     methylPREDNISolone (MEDROL) 4 MG Oral Tablet Therapy Pack 1 each 0 3/26/2022    How many in a pack? Last LABS - 3/2/22 cmp, cbc    No future appointments. Per PROTOCOL? None    Please Approve or Deny.

## 2022-06-21 ENCOUNTER — TELEPHONE (OUTPATIENT)
Dept: INTERNAL MEDICINE CLINIC | Facility: CLINIC | Age: 42
End: 2022-06-21

## 2022-06-21 DIAGNOSIS — Z13.220 SCREENING FOR LIPID DISORDERS: ICD-10-CM

## 2022-06-21 DIAGNOSIS — Z13.29 SCREENING FOR THYROID DISORDER: ICD-10-CM

## 2022-06-21 DIAGNOSIS — Z00.00 ROUTINE GENERAL MEDICAL EXAMINATION AT A HEALTH CARE FACILITY: Primary | ICD-10-CM

## 2022-06-21 NOTE — TELEPHONE ENCOUNTER
Future Appointments   Date Time Provider Lore Kumari   6/23/2022  9:40 AM Rand Whitaker MD [facility]     Annual Physical   Lab is [facility] Pt aware to fast and to complete labs no sooner than 2 weeks prior to physical   No call back required

## 2022-06-23 ENCOUNTER — TELEPHONE (OUTPATIENT)
Dept: INTERNAL MEDICINE CLINIC | Facility: CLINIC | Age: 42
End: 2022-06-23

## 2022-06-23 NOTE — TELEPHONE ENCOUNTER
Pt called and stated she missed her cpe with TB. Pt aware of $40 charge and stated she will reschedule through [employer].   FYI to TB    Pt aware of TB's schedule and may be need to schedule with partners

## 2022-08-15 ENCOUNTER — TELEPHONE (OUTPATIENT)
Dept: INTERNAL MEDICINE CLINIC | Facility: CLINIC | Age: 42
End: 2022-08-15

## 2022-08-15 NOTE — TELEPHONE ENCOUNTER
chronic headaches slightly dizzy pt stated there no need for a call back. She will wait to discuss with TB at the visit.    Future Appointments   Date Time Provider Lore Naz   8/23/2022  1:00 PM Cole Bence, MD [facility]

## 2022-08-23 ENCOUNTER — OFFICE VISIT (OUTPATIENT)
Dept: INTERNAL MEDICINE CLINIC | Facility: CLINIC | Age: 42
End: 2022-08-23
Payer: COMMERCIAL

## 2022-08-23 VITALS
TEMPERATURE: 98 F | BODY MASS INDEX: 31.57 KG/M2 | HEIGHT: 64.75 IN | SYSTOLIC BLOOD PRESSURE: 134 MMHG | DIASTOLIC BLOOD PRESSURE: 68 MMHG | WEIGHT: 187.19 LBS | HEART RATE: 69 BPM

## 2022-08-23 DIAGNOSIS — R53.81 MALAISE AND FATIGUE: ICD-10-CM

## 2022-08-23 DIAGNOSIS — Z13.220 SCREENING FOR LIPID DISORDERS: ICD-10-CM

## 2022-08-23 DIAGNOSIS — R53.83 MALAISE AND FATIGUE: ICD-10-CM

## 2022-08-23 DIAGNOSIS — G44.209 TENSION HEADACHE: Primary | ICD-10-CM

## 2022-08-23 DIAGNOSIS — G47.09 OTHER INSOMNIA: ICD-10-CM

## 2022-08-23 DIAGNOSIS — R35.0 FREQUENT URINATION: ICD-10-CM

## 2022-08-23 LAB
APPEARANCE: YELLOW
BILIRUBIN: NEGATIVE
GLUCOSE (URINE DIPSTICK): NEGATIVE MG/DL
KETONES (URINE DIPSTICK): NEGATIVE MG/DL
MULTISTIX LOT#: ABNORMAL NUMERIC
NITRITE, URINE: NEGATIVE
OCCULT BLOOD: NEGATIVE
PH, URINE: 7 (ref 4.5–8)
PROTEIN (URINE DIPSTICK): NEGATIVE MG/DL
SPECIFIC GRAVITY: >1.03 (ref 1–1.03)
URINE-COLOR: CLEAR
UROBILINOGEN,SEMI-QN: 0.2 MG/DL (ref 0–1.9)

## 2022-08-23 PROCEDURE — 81003 URINALYSIS AUTO W/O SCOPE: CPT | Performed by: INTERNAL MEDICINE

## 2022-08-23 PROCEDURE — 3078F DIAST BP <80 MM HG: CPT | Performed by: INTERNAL MEDICINE

## 2022-08-23 PROCEDURE — 3008F BODY MASS INDEX DOCD: CPT | Performed by: INTERNAL MEDICINE

## 2022-08-23 PROCEDURE — 99214 OFFICE O/P EST MOD 30 MIN: CPT | Performed by: INTERNAL MEDICINE

## 2022-08-23 PROCEDURE — 3075F SYST BP GE 130 - 139MM HG: CPT | Performed by: INTERNAL MEDICINE

## 2022-12-30 NOTE — TELEPHONE ENCOUNTER
Hypertensive Medications  Refill passed per [facility], [facility] protocol.       Protocol Criteria:  · Appointment scheduled in the past 6 months or in the next 3 months  · BMP or CMP in the past 12 months  · Creatinine result < 2  Recent Outpatient Visits
Yes

## 2023-03-16 ENCOUNTER — TELEPHONE (OUTPATIENT)
Dept: OBGYN CLINIC | Facility: CLINIC | Age: 43
End: 2023-03-16

## 2023-03-16 DIAGNOSIS — Z12.31 ENCOUNTER FOR SCREENING MAMMOGRAM FOR MALIGNANT NEOPLASM OF BREAST: Primary | ICD-10-CM

## 2023-03-16 NOTE — TELEPHONE ENCOUNTER
Last annual 3/26/21  Next annual 7/3/23  Last mammo 7/21/21    To CAP- okay to place screening mammo order or wait for July annual?

## 2023-03-17 DIAGNOSIS — I10 ESSENTIAL HYPERTENSION: ICD-10-CM

## 2023-03-18 NOTE — TELEPHONE ENCOUNTER
Last VISIT - 8/23/22 headache    Last CPE - No recent physical    Last REFILL -     spironolactone 100 MG Oral Tab 90 tablet 3 6/1/2021     Last LABS - 7/21/22 cbc    Per PROTOCOL? Failed    Please Approve or Deny.

## 2023-03-18 NOTE — TELEPHONE ENCOUNTER
Requested Prescriptions     Pending Prescriptions Disp Refills   • ValACYclovir HCl 500 MG Oral Tab 90 tablet 3     Sig: Take 1 tablet (500 mg total) by mouth daily.        Duplicate request.
No

## 2023-03-19 RX ORDER — SPIRONOLACTONE 100 MG/1
100 TABLET, FILM COATED ORAL DAILY
Qty: 90 TABLET | Refills: 0 | Status: SHIPPED | OUTPATIENT
Start: 2023-03-19

## 2023-03-20 ENCOUNTER — HOSPITAL ENCOUNTER (OUTPATIENT)
Dept: MAMMOGRAPHY | Age: 43
Discharge: HOME | End: 2023-03-20
Attending: INTERNAL MEDICINE
Payer: COMMERCIAL

## 2023-03-20 DIAGNOSIS — Z12.31 ENCOUNTER FOR SCREENING MAMMOGRAM FOR MALIGNANT NEOPLASM OF BREAST: ICD-10-CM

## 2023-03-20 PROCEDURE — 77063 BREAST TOMOSYNTHESIS BI: CPT | Performed by: OBSTETRICS & GYNECOLOGY

## 2023-03-20 PROCEDURE — 77067 SCR MAMMO BI INCL CAD: CPT | Performed by: OBSTETRICS & GYNECOLOGY

## 2023-03-26 NOTE — PROGRESS NOTES
HPI:    Patient ID: Pau Mcfarlane is a 44year old female. See below        Review of Systems         Current Outpatient Prescriptions: AmLODIPine Besylate 5 MG Oral Tab Take 5 mg by mouth daily.  Disp:  Rfl:    BuPROPion HCl ER, XL, 150 MG Oral Tablet
2/17/23
none

## 2023-07-03 ENCOUNTER — OFFICE VISIT (OUTPATIENT)
Dept: OBGYN CLINIC | Facility: CLINIC | Age: 43
End: 2023-07-03

## 2023-07-03 ENCOUNTER — LAB ENCOUNTER (OUTPATIENT)
Dept: LAB | Facility: HOSPITAL | Age: 43
End: 2023-07-03
Attending: OBSTETRICS & GYNECOLOGY
Payer: COMMERCIAL

## 2023-07-03 ENCOUNTER — TELEPHONE (OUTPATIENT)
Dept: OBGYN CLINIC | Facility: CLINIC | Age: 43
End: 2023-07-03

## 2023-07-03 VITALS
BODY MASS INDEX: 34 KG/M2 | WEIGHT: 205.63 LBS | HEART RATE: 101 BPM | SYSTOLIC BLOOD PRESSURE: 133 MMHG | DIASTOLIC BLOOD PRESSURE: 87 MMHG

## 2023-07-03 DIAGNOSIS — G44.209 TENSION HEADACHE: ICD-10-CM

## 2023-07-03 DIAGNOSIS — R53.81 MALAISE AND FATIGUE: ICD-10-CM

## 2023-07-03 DIAGNOSIS — Z01.419 ENCOUNTER FOR GYNECOLOGICAL EXAMINATION WITHOUT ABNORMAL FINDING: Primary | ICD-10-CM

## 2023-07-03 DIAGNOSIS — Z11.3 SCREEN FOR STD (SEXUALLY TRANSMITTED DISEASE): ICD-10-CM

## 2023-07-03 DIAGNOSIS — R35.0 FREQUENT URINATION: ICD-10-CM

## 2023-07-03 DIAGNOSIS — Z13.220 SCREENING FOR LIPID DISORDERS: ICD-10-CM

## 2023-07-03 DIAGNOSIS — R53.83 MALAISE AND FATIGUE: ICD-10-CM

## 2023-07-03 LAB
BASOPHILS # BLD AUTO: 0.05 X10(3) UL (ref 0–0.2)
BASOPHILS NFR BLD AUTO: 0.8 %
CHOLEST SERPL-MCNC: 201 MG/DL (ref ?–200)
DEPRECATED RDW RBC AUTO: 40.3 FL (ref 35.1–46.3)
EOSINOPHIL # BLD AUTO: 0.06 X10(3) UL (ref 0–0.7)
EOSINOPHIL NFR BLD AUTO: 0.9 %
ERYTHROCYTE [DISTWIDTH] IN BLOOD BY AUTOMATED COUNT: 12.1 % (ref 11–15)
FASTING PATIENT LIPID ANSWER: NO
HCT VFR BLD AUTO: 41.1 %
HDLC SERPL-MCNC: 116 MG/DL (ref 40–59)
HGB BLD-MCNC: 13.4 G/DL
IMM GRANULOCYTES # BLD AUTO: 0.01 X10(3) UL (ref 0–1)
IMM GRANULOCYTES NFR BLD: 0.2 %
LDLC SERPL CALC-MCNC: 71 MG/DL (ref ?–100)
LYMPHOCYTES # BLD AUTO: 1.33 X10(3) UL (ref 1–4)
LYMPHOCYTES NFR BLD AUTO: 20.2 %
MCH RBC QN AUTO: 29.5 PG (ref 26–34)
MCHC RBC AUTO-ENTMCNC: 32.6 G/DL (ref 31–37)
MCV RBC AUTO: 90.3 FL
MONOCYTES # BLD AUTO: 0.4 X10(3) UL (ref 0.1–1)
MONOCYTES NFR BLD AUTO: 6.1 %
NEUTROPHILS # BLD AUTO: 4.75 X10 (3) UL (ref 1.5–7.7)
NEUTROPHILS # BLD AUTO: 4.75 X10(3) UL (ref 1.5–7.7)
NEUTROPHILS NFR BLD AUTO: 71.8 %
NONHDLC SERPL-MCNC: 85 MG/DL (ref ?–130)
PLATELET # BLD AUTO: 297 10(3)UL (ref 150–450)
RBC # BLD AUTO: 4.55 X10(6)UL
T4 FREE SERPL-MCNC: 0.9 NG/DL (ref 0.8–1.7)
TRIGL SERPL-MCNC: 81 MG/DL (ref 30–149)
TSI SER-ACNC: 1.1 MIU/ML (ref 0.36–3.74)
VIT B12 SERPL-MCNC: 628 PG/ML (ref 193–986)
VIT D+METAB SERPL-MCNC: 18.2 NG/ML (ref 30–100)
VLDLC SERPL CALC-MCNC: 12 MG/DL (ref 0–30)
WBC # BLD AUTO: 6.6 X10(3) UL (ref 4–11)

## 2023-07-03 PROCEDURE — 84443 ASSAY THYROID STIM HORMONE: CPT | Performed by: INTERNAL MEDICINE

## 2023-07-03 PROCEDURE — 87591 N.GONORRHOEAE DNA AMP PROB: CPT | Performed by: OBSTETRICS & GYNECOLOGY

## 2023-07-03 PROCEDURE — 80061 LIPID PANEL: CPT | Performed by: INTERNAL MEDICINE

## 2023-07-03 PROCEDURE — 82306 VITAMIN D 25 HYDROXY: CPT | Performed by: INTERNAL MEDICINE

## 2023-07-03 PROCEDURE — 87661 TRICHOMONAS VAGINALIS AMPLIF: CPT | Performed by: OBSTETRICS & GYNECOLOGY

## 2023-07-03 PROCEDURE — 87491 CHLMYD TRACH DNA AMP PROBE: CPT | Performed by: OBSTETRICS & GYNECOLOGY

## 2023-07-03 PROCEDURE — 82607 VITAMIN B-12: CPT | Performed by: INTERNAL MEDICINE

## 2023-07-03 PROCEDURE — 87086 URINE CULTURE/COLONY COUNT: CPT | Performed by: INTERNAL MEDICINE

## 2023-07-03 PROCEDURE — 85025 COMPLETE CBC W/AUTO DIFF WBC: CPT | Performed by: INTERNAL MEDICINE

## 2023-07-03 PROCEDURE — 84439 ASSAY OF FREE THYROXINE: CPT | Performed by: INTERNAL MEDICINE

## 2023-07-03 RX ORDER — DEXTROAMPHETAMINE SACCHARATE, AMPHETAMINE ASPARTATE, DEXTROAMPHETAMINE SULFATE AND AMPHETAMINE SULFATE 1.25; 1.25; 1.25; 1.25 MG/1; MG/1; MG/1; MG/1
1 TABLET ORAL 2 TIMES DAILY
COMMUNITY
Start: 2023-03-05

## 2023-07-05 LAB
C TRACH DNA SPEC QL NAA+PROBE: NEGATIVE
N GONORRHOEA DNA SPEC QL NAA+PROBE: NEGATIVE
T PALLIDUM AB SER QL: NEGATIVE
T VAGINALIS RRNA SPEC QL NAA+PROBE: NEGATIVE

## 2023-07-10 ENCOUNTER — TELEPHONE (OUTPATIENT)
Dept: INTERNAL MEDICINE CLINIC | Facility: CLINIC | Age: 43
End: 2023-07-10

## 2023-07-10 DIAGNOSIS — I10 ESSENTIAL HYPERTENSION: ICD-10-CM

## 2023-07-10 RX ORDER — SPIRONOLACTONE 100 MG/1
100 TABLET, FILM COATED ORAL DAILY
Qty: 90 TABLET | Refills: 0 | Status: SHIPPED | OUTPATIENT
Start: 2023-07-10

## 2023-07-10 NOTE — TELEPHONE ENCOUNTER
Last VISIT - 8/23/22 Tension headache, insomnia and fatgue    Last CPE - No recent physical    Last REFILL -     spironolactone 100 MG Oral Tab 90 tablet 0 3/19/2023     Last LABS - 7/3/23 lipid, tsh, cbc 7/21/22 cmp    No future appointments. Per PROTOCOL? Failed    Please Approve or Deny.

## 2023-07-12 DIAGNOSIS — I10 ESSENTIAL HYPERTENSION: ICD-10-CM

## 2023-07-12 RX ORDER — SPIRONOLACTONE 100 MG/1
100 TABLET, FILM COATED ORAL DAILY
Qty: 90 TABLET | Refills: 0 | Status: CANCELLED | OUTPATIENT
Start: 2023-07-12

## 2023-07-12 NOTE — TELEPHONE ENCOUNTER
Hypertension Medications Protocol [id number]/12/2023 12:30 PM   Protocol Details CMP or BMP in past 12 months    Appointment in past 6 or next 3 months    Last serum creatinine< 2.0       To be filled at: None [Patient requested: Kate

## 2023-09-20 NOTE — TELEPHONE ENCOUNTER
Sending warning letter regular and certified mail. Ally [address] 8/23/22,  3 months for CPE  6/21/23 lm, mcm  7/25/23 lm, mcm  8/3/23 lm, letter    Sending copy to scanning.

## 2024-10-24 NOTE — LETTER
"Patient ID: Keri Sahni is a 60 y.o. female.    Chief Complaint: Gynecologic Exam          HPI:  Pt is  here for annual gyn exam. Denies hx of abnormal pap smear. Pt with hx of supracervical hysterectomy with BSO secondary to AUB-L. Denies vaginal bleeding or discharge. Denies pain. States is not sexually active.Last pap 10/2023-NIL;HPV negative.NO gyn complaints.      Review of Systems:   Negative except for findings in HPI     Objective:   /79   Pulse 70   Temp 98.3 °F (36.8 °C) (Oral)   Resp 18   Ht 5' 2" (1.575 m)   Wt 79.1 kg (174 lb 6.4 oz)   SpO2 98%   BMI 31.90 kg/m²    Physical Exam:  GENERAL: Pt is aware and alert and  in no acute distress.  BREASTS: Bilateral-No masses, nipple discharge, skin changes, or tenderness.  ABDOMEN: Soft, non tender.  VULVA:  no lesions/no skin changes  URETHRA: No lesions  BLADDER: No tenderness.  VAGINA: Mucosa normal,no discharge; no lesions.  CERVIX:  no CMT, NO discharge; NO lesions nabothian cyst 11 oclock  BIMANUAL EXAM:  The uterus is absent Praveen adnexa reveal no evidence of masses; no fullness   SKIN: Warm and Dry  PSYCHIATRIC: Patient is awake and alert. Mood and affect are normal.  Assessment:   Women's annual routine gynecological examination    Screening mammogram for breast cancer  -     Mammo Digital Screening Bilat w/ Alex; Future; Expected date: 2024            1. Women's annual routine gynecological examination    2. Screening mammogram for breast cancer             -pap UTD  Plan:       Follow up in about 1 year (around 10/24/2025).    " 1/7/2019          To Whom It May Concern:    Leonardo Nainmarija Davidson is currently under my medical care. She has history of obesity with PCOS and hypertension. Conservative weight loss measures have not been effective in the long term.  I recommend sleeve gastrectom

## 2025-03-20 ENCOUNTER — HOSPITAL ENCOUNTER (OUTPATIENT)
Dept: MAMMOGRAPHY | Age: 45
Discharge: HOME | End: 2025-03-20
Attending: INTERNAL MEDICINE
Payer: MEDICAID

## 2025-03-20 DIAGNOSIS — Z12.31 VISIT FOR SCREENING MAMMOGRAM: ICD-10-CM

## 2025-03-20 PROCEDURE — 77063 BREAST TOMOSYNTHESIS BI: CPT | Performed by: INTERNAL MEDICINE

## 2025-03-20 PROCEDURE — 77067 SCR MAMMO BI INCL CAD: CPT | Performed by: INTERNAL MEDICINE

## 2025-03-27 ENCOUNTER — TELEPHONE (OUTPATIENT)
Dept: INTERNAL MEDICINE CLINIC | Facility: CLINIC | Age: 45
End: 2025-03-27

## 2025-04-01 RX ORDER — DEXTROAMPHETAMINE SACCHARATE, AMPHETAMINE ASPARTATE, DEXTROAMPHETAMINE SULFATE AND AMPHETAMINE SULFATE 1.25; 1.25; 1.25; 1.25 MG/1; MG/1; MG/1; MG/1
1 TABLET ORAL 2 TIMES DAILY
Refills: 0 | OUTPATIENT
Start: 2025-04-01

## (undated) DIAGNOSIS — L27.0 DRUG RASH: ICD-10-CM

## (undated) DIAGNOSIS — R21 RASH AND NONSPECIFIC SKIN ERUPTION: Primary | ICD-10-CM

## (undated) DEVICE — CHLORAPREP 26ML APPLICATOR

## (undated) DEVICE — Device: Brand: PLUMEPEN

## (undated) DEVICE — USE ITEM #176901

## (undated) DEVICE — STERILE POLYISOPRENE POWDER-FREE SURGICAL GLOVES: Brand: PROTEXIS

## (undated) DEVICE — SCD SLEEVE KNEE HI BLEND

## (undated) DEVICE — MINI LAP PACK-LF: Brand: MEDLINE INDUSTRIES, INC.

## (undated) DEVICE — SOL  .9 1000ML BTL

## (undated) NOTE — LETTER
10/14/19      Susanne Davidson  [address] [place] [address]      Dear Josue Marsh,    [facility] records indicate that you have outstanding lab work and or testing that was ordered for you and has not yet been completed:  Orders Placed This Encounter      Basic

## (undated) NOTE — LETTER
5/4/2021              [address] [facility] [address]         Dear Ada Gomez,      It was a pleasure to see you at our [address] [place] [address] office.  Neg pap, HPV neg, repeat pap needed in

## (undated) NOTE — MR AVS SNAPSHOT
[facility]  [address], [address] [place] [address]  [phone]               Thank you for choosing us for your health care visit with Alessandra Schwartz.  Adalid Dotson MD.  We are glad to serve you and happy to provide you with this summary Take 1 tablet (150 mg total) by mouth daily. Commonly known as:  WELLBUTRIN XL           fluconazole 150 MG Tabs   Take 1 tablet (150 mg total) by mouth once.    Commonly known as:  DIFLUCAN           spironolactone 100 MG Tabs   Take 1 tablet (100 mg tot

## (undated) NOTE — LETTER
AUTHORIZATION FOR SURGICAL OPERATION OR OTHER PROCEDURE    1.  I hereby authorize Dr. Rafa Parekh, and [facility] staff assigned to my case to perform the following operation and/or procedure at the [facility]:    _____Endometrial Biopsy____________ Patient Name:  ______________________________________________________  (please print)      Patient signature:  ___________________________________________________             Relationship to Patient:           []  Parent    Responsible person

## (undated) NOTE — LETTER
9/20/2023    Akila Davidson  [address] [place] [address]    Dear Africa Gabriel,    The providers at [address] [place] [address],[facility] are concerned about your health. You have not been seen at this office since 8/23/22. At that time, a 3 month follow up for your annual physical was recommended. Our office attempted to contact you by phone on 6/21/23, 7/25/23, 8/3/23, MyChart on 6/21/23, 7/25/23, Mail on 8/3/23. If you are interested in retaining your active patient status at [address] [place] [address],[facility], please review the following expectations:  Schedule and attend an appointment with Dr. Elizabeth Lynn by 10/20/23  Continue to attend appointments as recommended       It is the policy of the [address] [place] [address],[facility] to terminate a physician-patient relationship for repeated non-compliance. Please be advised that should this continue; we may begin the dismissal process from future medical care from [address] [place] [address],[facility]. To schedule an appointment, please use [employer] or call us at [phone]. Thank you for your understanding of this serious matter.    Sincerely,  Lore Leyva

## (undated) NOTE — MR AVS SNAPSHOT
After Visit Summary   3/26/2021    Bettina Russo    MRN: [id number]           Visit Information     Date & Time  3/26/2021 11:20 AM Provider  Goldie Hanks MD [address] [place] [address], [address] [place] [address],[address], [facility]  Phone  [phone] Central Scheduling at [phone], Monday through Friday between 7:30am to 6pm and on Saturday between 8am and 1pm. Evening and weekend appointments for your exam are available.      [facility] illness or injury that does not require immediate attention VIDEO VISITS  Average cost  $35*    e-VISTS  Average cost  $35*     SAME DAY APPOINTMENTS   Available at primary care offices    [address] [place] [address]  OFFICE VISIT   Primary Care Providers  T

## (undated) NOTE — LETTER
9/6/2018              [address]          Dear Justine Webb records indicate that the tests ordered for you by Juliana Clark MD  have not been done.   If you have, in fact, already completed the te

## (undated) NOTE — MR AVS SNAPSHOT
[facility]  [address], [address] [place] [address]  [phone]               Thank you for choosing us for your health care visit with Janny Sheth.  Cira Knox MD.  We are glad to serve you and happy to provide you with this summary Take 1 tablet (15 mg total) by mouth daily. Commonly known as:  ADDERALL           BuPROPion HCl ER (XL) 150 MG Tb24   Take 1 tablet (150 mg total) by mouth daily.    Commonly known as:  WELLBUTRIN XL           spironolactone 100 MG Tabs   Take 1 tablet ( discharge instructions in [facility] by going to Visits < Admission Summaries. If you've been to the Emergency Department or your doctor's office, you can view your past visit information in [facility] by going to Visits < Visit Summaries. [employer] questions?

## (undated) NOTE — LETTER
07/01/21        [address] [place] [id number] [facility]      Dear Lorna Swift,    Our records indicate that you have outstanding lab work and or testing that was ordered for you and has not yet been completed:  Orders Placed This Encounter      Hemo

## (undated) NOTE — MR AVS SNAPSHOT
After Visit Summary   3/23/2017    Chelsie Lo    MRN: [id number]           Visit Information        Provider Department Dept Phone    3/23/2017 11:15 AM Radha Hebert.  Della Pemberton MD [facility] [phone]      Your Vitals Were     BP Pulse Temp(Src) 3/23/2018    CHLAMYDIA/GONOCOCCUS, VILLA [3212137 CUSTOM]  3/23/2017 (Approximate) 3/23/2018    HERPES SIMPLEX VIRUS I/II, IGG [[id number] CUSTOM]  3/23/2017 (Approximate) 3/23/2018    HIV AG AB COMBO [[id number] CUSTOM]  3/23/2017 (Approximate) 3/23/2018    HSV 1

## (undated) NOTE — MR AVS SNAPSHOT
[facility] DIV[facility]  [address], [address]  [phone]               Thank you for choosing us for your health care visit with Amee Franz.  Doug Bustos MD.  We are glad to serve you and happy to provide you with this summary Assoc Dx:  Encounter for gynecological examination without abnormal finding [Z01.419]           HIV AG AB Combo [E]    Complete by:   Mar 23, 2017 (Approximate)    Assoc Dx:  Encounter for gynecological examination without abnormal finding [Z01.419] Results of Recent Testing       MyChart     Visit MyChart  You can access your MyChart to more actively manage your health care and view more details from this visit by going to [email or web address].   If you've recently had a stay at the [facility] (DP/SNF)

## (undated) NOTE — LETTER
03/19/21        Mason Davidson  [address] [facility]      Dear Ada Gomez,    Our records indicate that you have outstanding lab work and or testing that was ordered for you and has not yet been completed:  Orders Placed This Encounter      TSH

## (undated) NOTE — LETTER
4/16/2021              [address]        [address] [place] [address]         Dear Hunter Carranza,    This letter is to inform you that our office has made several attempts to reach you by phone without success.   We were attempting to contact you